# Patient Record
Sex: MALE | Race: WHITE | NOT HISPANIC OR LATINO | Employment: FULL TIME | URBAN - METROPOLITAN AREA
[De-identification: names, ages, dates, MRNs, and addresses within clinical notes are randomized per-mention and may not be internally consistent; named-entity substitution may affect disease eponyms.]

---

## 2017-05-08 ENCOUNTER — LAB (OUTPATIENT)
Dept: SPORTS MEDICINE | Facility: CLINIC | Age: 52
End: 2017-05-08

## 2017-05-08 DIAGNOSIS — Z00.00 PREVENTATIVE HEALTH CARE: Primary | ICD-10-CM

## 2017-05-08 DIAGNOSIS — Z00.00 PREVENTATIVE HEALTH CARE: ICD-10-CM

## 2017-05-09 LAB
ALBUMIN SERPL-MCNC: 4.3 G/DL (ref 3.5–5.2)
ALBUMIN/GLOB SERPL: 1.4 G/DL
ALP SERPL-CCNC: 53 U/L (ref 39–117)
ALT SERPL-CCNC: 28 U/L (ref 1–41)
APPEARANCE UR: CLEAR
AST SERPL-CCNC: 21 U/L (ref 1–40)
BACTERIA #/AREA URNS HPF: NORMAL /HPF
BASOPHILS # BLD AUTO: 0.01 10*3/MM3 (ref 0–0.2)
BASOPHILS NFR BLD AUTO: 0.2 % (ref 0–1.5)
BILIRUB SERPL-MCNC: 1.2 MG/DL (ref 0.1–1.2)
BILIRUB UR QL STRIP: NEGATIVE
BUN SERPL-MCNC: 10 MG/DL (ref 6–20)
BUN/CREAT SERPL: 9.5 (ref 7–25)
CALCIUM SERPL-MCNC: 9.6 MG/DL (ref 8.6–10.5)
CHLORIDE SERPL-SCNC: 101 MMOL/L (ref 98–107)
CHOLEST SERPL-MCNC: 216 MG/DL (ref 0–200)
CHOLEST/HDLC SERPL: 3.22 {RATIO}
CO2 SERPL-SCNC: 28.8 MMOL/L (ref 22–29)
COLOR UR: YELLOW
CREAT SERPL-MCNC: 1.05 MG/DL (ref 0.76–1.27)
EOSINOPHIL # BLD AUTO: 0.29 10*3/MM3 (ref 0–0.7)
EOSINOPHIL NFR BLD AUTO: 5.2 % (ref 0.3–6.2)
EPI CELLS #/AREA URNS HPF: NORMAL /HPF
ERYTHROCYTE [DISTWIDTH] IN BLOOD BY AUTOMATED COUNT: 13.6 % (ref 11.5–14.5)
GLOBULIN SER CALC-MCNC: 3 GM/DL
GLUCOSE SERPL-MCNC: 90 MG/DL (ref 65–99)
GLUCOSE UR QL: NEGATIVE
HCT VFR BLD AUTO: 51 % (ref 40.4–52.2)
HCV AB S/CO SERPL IA: 0.1 S/CO RATIO (ref 0–0.9)
HDLC SERPL-MCNC: 67 MG/DL (ref 40–60)
HGB BLD-MCNC: 17 G/DL (ref 13.7–17.6)
HGB UR QL STRIP: NEGATIVE
IMM GRANULOCYTES # BLD: 0 10*3/MM3 (ref 0–0.03)
IMM GRANULOCYTES NFR BLD: 0 % (ref 0–0.5)
KETONES UR QL STRIP: NEGATIVE
LDLC SERPL CALC-MCNC: 130 MG/DL (ref 0–100)
LEUKOCYTE ESTERASE UR QL STRIP: NEGATIVE
LYMPHOCYTES # BLD AUTO: 1.88 10*3/MM3 (ref 0.9–4.8)
LYMPHOCYTES NFR BLD AUTO: 33.4 % (ref 19.6–45.3)
MCH RBC QN AUTO: 30.2 PG (ref 27–32.7)
MCHC RBC AUTO-ENTMCNC: 33.3 G/DL (ref 32.6–36.4)
MCV RBC AUTO: 90.6 FL (ref 79.8–96.2)
MICRO URNS: NORMAL
MICRO URNS: NORMAL
MONOCYTES # BLD AUTO: 0.62 10*3/MM3 (ref 0.2–1.2)
MONOCYTES NFR BLD AUTO: 11 % (ref 5–12)
MUCOUS THREADS URNS QL MICRO: PRESENT /HPF
NEUTROPHILS # BLD AUTO: 2.83 10*3/MM3 (ref 1.9–8.1)
NEUTROPHILS NFR BLD AUTO: 50.2 % (ref 42.7–76)
NITRITE UR QL STRIP: NEGATIVE
PH UR STRIP: 6.5 [PH] (ref 5–7.5)
PLATELET # BLD AUTO: 213 10*3/MM3 (ref 140–500)
POTASSIUM SERPL-SCNC: 4.3 MMOL/L (ref 3.5–5.2)
PROT SERPL-MCNC: 7.3 G/DL (ref 6–8.5)
PROT UR QL STRIP: NEGATIVE
PSA SERPL-MCNC: 0.71 NG/ML (ref 0–4)
RBC # BLD AUTO: 5.63 10*6/MM3 (ref 4.6–6)
RBC #/AREA URNS HPF: NORMAL /HPF
SODIUM SERPL-SCNC: 141 MMOL/L (ref 136–145)
SP GR UR: 1.01 (ref 1–1.03)
T4 FREE SERPL-MCNC: 1.36 NG/DL (ref 0.93–1.7)
TRIGL SERPL-MCNC: 97 MG/DL (ref 0–150)
TSH SERPL DL<=0.005 MIU/L-ACNC: 1.66 MIU/ML (ref 0.27–4.2)
URINALYSIS REFLEX: NORMAL
UROBILINOGEN UR STRIP-MCNC: 0.2 MG/DL (ref 0.2–1)
VLDLC SERPL CALC-MCNC: 19.4 MG/DL (ref 5–40)
WBC # BLD AUTO: 5.63 10*3/MM3 (ref 4.5–10.7)
WBC #/AREA URNS HPF: NORMAL /HPF

## 2017-05-16 ENCOUNTER — OFFICE VISIT (OUTPATIENT)
Dept: SPORTS MEDICINE | Facility: CLINIC | Age: 52
End: 2017-05-16

## 2017-05-16 VITALS
OXYGEN SATURATION: 99 % | HEIGHT: 74 IN | HEART RATE: 46 BPM | SYSTOLIC BLOOD PRESSURE: 108 MMHG | DIASTOLIC BLOOD PRESSURE: 72 MMHG | BODY MASS INDEX: 26.54 KG/M2 | WEIGHT: 206.78 LBS | RESPIRATION RATE: 16 BRPM

## 2017-05-16 DIAGNOSIS — B35.1 ONYCHOMYCOSIS OF TOENAIL: ICD-10-CM

## 2017-05-16 DIAGNOSIS — Z00.00 PREVENTATIVE HEALTH CARE: Primary | ICD-10-CM

## 2017-05-16 PROCEDURE — 99396 PREV VISIT EST AGE 40-64: CPT | Performed by: FAMILY MEDICINE

## 2017-05-16 RX ORDER — TERBINAFINE HYDROCHLORIDE 250 MG/1
250 TABLET ORAL DAILY
Qty: 90 TABLET | Refills: 0 | Status: SHIPPED | OUTPATIENT
Start: 2017-05-16 | End: 2018-09-06

## 2018-08-29 DIAGNOSIS — Z00.00 ANNUAL PHYSICAL EXAM: Primary | ICD-10-CM

## 2018-08-29 DIAGNOSIS — Z13.220 SCREENING CHOLESTEROL LEVEL: ICD-10-CM

## 2018-08-29 DIAGNOSIS — Z12.5 PROSTATE CANCER SCREENING: ICD-10-CM

## 2018-08-29 DIAGNOSIS — R79.89 ABNORMAL THYROID BLOOD TEST: ICD-10-CM

## 2018-09-06 ENCOUNTER — OFFICE VISIT (OUTPATIENT)
Dept: SPORTS MEDICINE | Facility: CLINIC | Age: 53
End: 2018-09-06

## 2018-09-06 VITALS
HEIGHT: 74 IN | HEART RATE: 55 BPM | DIASTOLIC BLOOD PRESSURE: 68 MMHG | WEIGHT: 202 LBS | SYSTOLIC BLOOD PRESSURE: 108 MMHG | TEMPERATURE: 98.8 F | BODY MASS INDEX: 25.93 KG/M2 | OXYGEN SATURATION: 97 %

## 2018-09-06 DIAGNOSIS — Z00.00 PREVENTATIVE HEALTH CARE: Primary | ICD-10-CM

## 2018-09-06 PROCEDURE — 99396 PREV VISIT EST AGE 40-64: CPT | Performed by: FAMILY MEDICINE

## 2018-09-06 RX ORDER — DEXTROMETHORPHAN HYDROBROMIDE AND PROMETHAZINE HYDROCHLORIDE 15; 6.25 MG/5ML; MG/5ML
SYRUP ORAL
COMMUNITY
Start: 2018-07-02 | End: 2018-09-06

## 2018-09-06 RX ORDER — TADALAFIL 5 MG/1
TABLET ORAL
COMMUNITY
End: 2018-09-06

## 2018-09-06 RX ORDER — AZITHROMYCIN 250 MG/1
TABLET, FILM COATED ORAL
COMMUNITY
Start: 2018-07-13 | End: 2018-09-06

## 2018-09-06 RX ORDER — PREDNISONE 10 MG/1
TABLET ORAL
COMMUNITY
Start: 2018-07-02 | End: 2018-09-06

## 2018-09-06 RX ORDER — TERBINAFINE HYDROCHLORIDE 250 MG/1
TABLET ORAL
COMMUNITY
Start: 2017-05-16 | End: 2018-09-06

## 2018-09-06 NOTE — PROGRESS NOTES
"Chuck Fierro is here today for an annual physical exam.     Eating a healthy diet. Exercising routinely, started biking and \" I feel as good as I have in a long time\".      I have reviewed the patient's medical, family, and social history in detail and updated the computerized patient record.    Screening history:  Colonoscopy - 2016    Prostate - 2017  Metabolic - last year    Health Maintenance   Topic Date Due   • ZOSTER VACCINE (1 of 2) 09/23/2015   • ANNUAL PHYSICAL  05/17/2018   • INFLUENZA VACCINE  08/01/2018   • COLONOSCOPY  05/20/2026   • TDAP/TD VACCINES (2 - Td) 02/09/2027   • HEPATITIS C SCREENING  Completed       Review of Systems   Constitutional: Negative for activity change, appetite change, chills, diaphoresis, fatigue, fever and unexpected weight change.   HENT: Negative for congestion, ear pain, postnasal drip, rhinorrhea, sinus pressure, sneezing, sore throat and trouble swallowing.    Eyes: Negative for visual disturbance.   Respiratory: Negative for cough, chest tightness, shortness of breath and wheezing.    Cardiovascular: Negative for chest pain and palpitations.   Gastrointestinal: Negative for abdominal pain, blood in stool, nausea and vomiting.   Endocrine: Negative for cold intolerance, polydipsia, polyphagia and polyuria.   Genitourinary: Negative for dysuria, flank pain, frequency, hematuria and urgency.   Musculoskeletal: Negative for arthralgias, back pain, joint swelling and myalgias.   Skin: Negative for rash.   Allergic/Immunologic: Negative for environmental allergies.   Neurological: Negative for dizziness, syncope, weakness, numbness and headaches.   Hematological: Negative for adenopathy. Does not bruise/bleed easily.   Psychiatric/Behavioral: Negative for agitation, decreased concentration, dysphoric mood, sleep disturbance and suicidal ideas. The patient is not nervous/anxious.        /68 (BP Location: Right arm, Patient Position: Sitting, Cuff Size: Adult)   Pulse " "55   Temp 98.8 °F (37.1 °C) (Oral)   Ht 188 cm (74.02\")   Wt 91.6 kg (202 lb)   SpO2 97%   BMI 25.92 kg/m²      Physical Exam    Vital signs reviewed.  General appearance: No acute distress  Eyes: conjunctiva clear without erythema; pupils equally round and reactive  ENT: external ears and nose normal; hearing normal, oropharynx clear  Neck: supple; no thyromegaly  CV: normal rate and rhythm; no peripheral edema  Respiratory: normal respiratory effort; lungs clear to auscultation bilaterally  MSK: normal gait and station; no focal joint deformity or swelling  Skin: no rash or wounds; normal turgor  Neuro: cranial nerves 2-12 grossly intact; normal sensation to light touch  Psych: mood and affect normal; recent and remote memory intact    Orders Only on 08/29/2018   Component Date Value Ref Range Status   • WBC 08/30/2018 7.79  4.50 - 10.70 10*3/mm3 Final   • RBC 08/30/2018 5.41  4.60 - 6.00 10*6/mm3 Final   • Hemoglobin 08/30/2018 16.0  13.7 - 17.6 g/dL Final   • Hematocrit 08/30/2018 50.5  40.4 - 52.2 % Final   • MCV 08/30/2018 93.3  79.8 - 96.2 fL Final   • MCH 08/30/2018 29.6  27.0 - 32.7 pg Final   • MCHC 08/30/2018 31.7* 32.6 - 36.4 g/dL Final   • RDW 08/30/2018 13.5  11.5 - 14.5 % Final   • Platelets 08/30/2018 229  140 - 500 10*3/mm3 Final   • Neutrophil Rel % 08/30/2018 63.7  42.7 - 76.0 % Final   • Lymphocyte Rel % 08/30/2018 24.6  19.6 - 45.3 % Final   • Monocyte Rel % 08/30/2018 9.5  5.0 - 12.0 % Final   • Eosinophil Rel % 08/30/2018 1.9  0.3 - 6.2 % Final   • Basophil Rel % 08/30/2018 0.3  0.0 - 1.5 % Final   • Neutrophils Absolute 08/30/2018 4.96  1.90 - 8.10 10*3/mm3 Final   • Lymphocytes Absolute 08/30/2018 1.92  0.90 - 4.80 10*3/mm3 Final   • Monocytes Absolute 08/30/2018 0.74  0.20 - 1.20 10*3/mm3 Final   • Eosinophils Absolute 08/30/2018 0.15  0.00 - 0.70 10*3/mm3 Final   • Basophils Absolute 08/30/2018 0.02  0.00 - 0.20 10*3/mm3 Final   • Immature Granulocyte Rel % 08/30/2018 0.0  0.0 - 0.5 % " Final   • Immature Grans Absolute 08/30/2018 0.00  0.00 - 0.03 10*3/mm3 Final   • Glucose 08/30/2018 92  65 - 99 mg/dL Final   • BUN 08/30/2018 13  6 - 20 mg/dL Final   • Creatinine 08/30/2018 1.12  0.76 - 1.27 mg/dL Final   • eGFR Non  Am 08/30/2018 69  >60 mL/min/1.73 Final   • eGFR African Am 08/30/2018 83  >60 mL/min/1.73 Final   • BUN/Creatinine Ratio 08/30/2018 11.6  7.0 - 25.0 Final   • Sodium 08/30/2018 141  136 - 145 mmol/L Final   • Potassium 08/30/2018 4.2  3.5 - 5.2 mmol/L Final   • Chloride 08/30/2018 101  98 - 107 mmol/L Final   • Total CO2 08/30/2018 27.8  22.0 - 29.0 mmol/L Final   • Calcium 08/30/2018 9.5  8.6 - 10.5 mg/dL Final   • Total Protein 08/30/2018 6.9  6.0 - 8.5 g/dL Final   • Albumin 08/30/2018 4.70  3.50 - 5.20 g/dL Final   • Globulin 08/30/2018 2.2  gm/dL Final   • A/G Ratio 08/30/2018 2.1  g/dL Final   • Total Bilirubin 08/30/2018 1.1  0.1 - 1.2 mg/dL Final   • Alkaline Phosphatase 08/30/2018 51  39 - 117 U/L Final   • AST (SGOT) 08/30/2018 23  1 - 40 U/L Final   • ALT (SGPT) 08/30/2018 30  1 - 41 U/L Final   • Total Cholesterol 08/30/2018 211* 0 - 200 mg/dL Final   • Triglycerides 08/30/2018 92  0 - 150 mg/dL Final   • HDL Cholesterol 08/30/2018 65* 40 - 60 mg/dL Final   • VLDL Cholesterol 08/30/2018 18.4  5 - 40 mg/dL Final   • LDL Cholesterol  08/30/2018 128* 0 - 100 mg/dL Final   • Chol/HDL Ratio 08/30/2018 3.25   Final   • TSH 08/30/2018 2.360  0.270 - 4.200 mIU/mL Final   • Free T4 08/30/2018 1.44  0.93 - 1.70 ng/dL Final   • Specific Gravity, UA 08/30/2018 1.017  1.005 - 1.030 Final   • pH, UA 08/30/2018 6.0  5.0 - 7.5 Final   • Color, UA 08/30/2018 Yellow  Yellow Final   • Appearance, UA 08/30/2018 Clear  Clear Final   • Leukocytes, UA 08/30/2018 Negative  Negative Final   • Protein 08/30/2018 Negative  Negative/Trace Final   • Glucose, UA 08/30/2018 Negative  Negative Final   • Ketones 08/30/2018 Negative  Negative Final   • Blood, UA 08/30/2018 Negative  Negative Final    • Bilirubin, UA 08/30/2018 Negative  Negative Final   • Urobilinogen, UA 08/30/2018 0.2  0.2 - 1.0 mg/dL Final   • Nitrite, UA 08/30/2018 Negative  Negative Final   • Microscopic Examination 08/30/2018 Comment   Final    Microscopic follows if indicated.   • MICROSCOPIC EXAMINATION 08/30/2018 See below:   Final    Microscopic was indicated and was performed.   • Urinalysis Reflex 08/30/2018 Comment   Final    This specimen will not reflex to a Urine Culture.   • PSA 08/30/2018 0.774  0.000 - 4.000 ng/mL Final   • WBC, UA 08/30/2018 0-5  0 - 5 /hpf Final   • RBC, UA 08/30/2018 None seen  0 - 2 /hpf Final   • Epithelial Cells (non renal) 08/30/2018 None seen  0 - 10 /hpf Final   • Casts 08/30/2018 Present* None seen /lpf Final   • Cast Type 08/30/2018 Hyaline casts  N/A Final   • Mucus, UA 08/30/2018 Present  Not Estab. /hpf Final   • Bacteria, UA 08/30/2018 None seen  None seen/Few /hpf Final        Chuck was seen today for annual exam.    Diagnoses and all orders for this visit:    Preventative health care        Encourage healthy diet and exercise.  Encourage patient to stay up to date on screening examinations as indicated based on age and risk factors.    EMR Dragon/Transcription disclaimer:    Much of this encounter note is an electronic transcription/translation of spoken language to printed text.  The electronic translation of spoken language may permit erroneous, or at times, nonsensical words or phrases to be inadvertently transcribed.  Although I have reviewed the note for such errors some may still exist.

## 2019-09-27 DIAGNOSIS — R79.89 ABNORMAL THYROID BLOOD TEST: ICD-10-CM

## 2019-09-27 DIAGNOSIS — Z12.5 PROSTATE CANCER SCREENING: ICD-10-CM

## 2019-09-27 DIAGNOSIS — Z00.00 ANNUAL PHYSICAL EXAM: Primary | ICD-10-CM

## 2019-09-27 DIAGNOSIS — Z13.220 SCREENING CHOLESTEROL LEVEL: ICD-10-CM

## 2019-09-28 LAB
ALBUMIN SERPL-MCNC: 4.8 G/DL (ref 3.5–5.2)
ALBUMIN/GLOB SERPL: 2 G/DL
ALP SERPL-CCNC: 57 U/L (ref 39–117)
ALT SERPL-CCNC: 47 U/L (ref 1–41)
APPEARANCE UR: CLEAR
AST SERPL-CCNC: 67 U/L (ref 1–40)
BACTERIA #/AREA URNS HPF: ABNORMAL /HPF
BASOPHILS # BLD AUTO: 0.02 10*3/MM3 (ref 0–0.2)
BASOPHILS NFR BLD AUTO: 0.3 % (ref 0–1.5)
BILIRUB SERPL-MCNC: 1.1 MG/DL (ref 0.2–1.2)
BILIRUB UR QL STRIP: NEGATIVE
BUN SERPL-MCNC: 15 MG/DL (ref 6–20)
BUN/CREAT SERPL: 14.3 (ref 7–25)
CALCIUM SERPL-MCNC: 9.6 MG/DL (ref 8.6–10.5)
CASTS URNS MICRO: ABNORMAL
CASTS URNS QL MICRO: PRESENT /LPF
CHLORIDE SERPL-SCNC: 101 MMOL/L (ref 98–107)
CHOLEST SERPL-MCNC: 242 MG/DL (ref 0–200)
CHOLEST/HDLC SERPL: 2.85 {RATIO}
CO2 SERPL-SCNC: 27.6 MMOL/L (ref 22–29)
COLOR UR: YELLOW
CREAT SERPL-MCNC: 1.05 MG/DL (ref 0.76–1.27)
EOSINOPHIL # BLD AUTO: 0.16 10*3/MM3 (ref 0–0.4)
EOSINOPHIL NFR BLD AUTO: 2 % (ref 0.3–6.2)
EPI CELLS #/AREA URNS HPF: ABNORMAL /HPF
ERYTHROCYTE [DISTWIDTH] IN BLOOD BY AUTOMATED COUNT: 12.4 % (ref 12.3–15.4)
GLOBULIN SER CALC-MCNC: 2.4 GM/DL
GLUCOSE SERPL-MCNC: 89 MG/DL (ref 65–99)
GLUCOSE UR QL: NEGATIVE
HCT VFR BLD AUTO: 49.6 % (ref 37.5–51)
HDLC SERPL-MCNC: 85 MG/DL (ref 40–60)
HGB BLD-MCNC: 16.6 G/DL (ref 13–17.7)
HGB UR QL STRIP: NEGATIVE
IMM GRANULOCYTES # BLD AUTO: 0.03 10*3/MM3 (ref 0–0.05)
IMM GRANULOCYTES NFR BLD AUTO: 0.4 % (ref 0–0.5)
KETONES UR QL STRIP: NEGATIVE
LDLC SERPL CALC-MCNC: 140 MG/DL (ref 0–100)
LEUKOCYTE ESTERASE UR QL STRIP: NEGATIVE
LYMPHOCYTES # BLD AUTO: 1.81 10*3/MM3 (ref 0.7–3.1)
LYMPHOCYTES NFR BLD AUTO: 22.9 % (ref 19.6–45.3)
MCH RBC QN AUTO: 29.1 PG (ref 26.6–33)
MCHC RBC AUTO-ENTMCNC: 33.5 G/DL (ref 31.5–35.7)
MCV RBC AUTO: 86.9 FL (ref 79–97)
MICRO URNS: NORMAL
MICRO URNS: NORMAL
MONOCYTES # BLD AUTO: 0.62 10*3/MM3 (ref 0.1–0.9)
MONOCYTES NFR BLD AUTO: 7.8 % (ref 5–12)
MUCOUS THREADS URNS QL MICRO: PRESENT /HPF
NEUTROPHILS # BLD AUTO: 5.26 10*3/MM3 (ref 1.7–7)
NEUTROPHILS NFR BLD AUTO: 66.6 % (ref 42.7–76)
NITRITE UR QL STRIP: NEGATIVE
NRBC BLD AUTO-RTO: 0 /100 WBC (ref 0–0.2)
PH UR STRIP: 6 [PH] (ref 5–7.5)
PLATELET # BLD AUTO: 251 10*3/MM3 (ref 140–450)
POTASSIUM SERPL-SCNC: 4.7 MMOL/L (ref 3.5–5.2)
PROT SERPL-MCNC: 7.2 G/DL (ref 6–8.5)
PROT UR QL STRIP: NORMAL
PSA SERPL-MCNC: 0.7 NG/ML (ref 0–4)
RBC # BLD AUTO: 5.71 10*6/MM3 (ref 4.14–5.8)
RBC #/AREA URNS HPF: ABNORMAL /HPF
SODIUM SERPL-SCNC: 142 MMOL/L (ref 136–145)
SP GR UR: 1.02 (ref 1–1.03)
T4 FREE SERPL-MCNC: 1.34 NG/DL (ref 0.93–1.7)
TRIGL SERPL-MCNC: 87 MG/DL (ref 0–150)
TSH SERPL DL<=0.005 MIU/L-ACNC: 1.77 UIU/ML (ref 0.27–4.2)
URINALYSIS REFLEX: NORMAL
UROBILINOGEN UR STRIP-MCNC: 0.2 MG/DL (ref 0.2–1)
VLDLC SERPL CALC-MCNC: 17.4 MG/DL
WBC # BLD AUTO: 7.9 10*3/MM3 (ref 3.4–10.8)
WBC #/AREA URNS HPF: ABNORMAL /HPF

## 2019-10-01 ENCOUNTER — OFFICE VISIT (OUTPATIENT)
Dept: SPORTS MEDICINE | Facility: CLINIC | Age: 54
End: 2019-10-01

## 2019-10-01 VITALS
DIASTOLIC BLOOD PRESSURE: 60 MMHG | BODY MASS INDEX: 25.67 KG/M2 | SYSTOLIC BLOOD PRESSURE: 102 MMHG | HEART RATE: 72 BPM | WEIGHT: 200 LBS | TEMPERATURE: 98.1 F | OXYGEN SATURATION: 96 % | HEIGHT: 74 IN

## 2019-10-01 DIAGNOSIS — L98.9 SKIN LESION: ICD-10-CM

## 2019-10-01 DIAGNOSIS — R74.8 ELEVATED LIVER ENZYMES: ICD-10-CM

## 2019-10-01 DIAGNOSIS — S89.92XA INJURY OF LEFT KNEE, INITIAL ENCOUNTER: ICD-10-CM

## 2019-10-01 DIAGNOSIS — Z00.00 PREVENTATIVE HEALTH CARE: Primary | ICD-10-CM

## 2019-10-01 PROCEDURE — 99396 PREV VISIT EST AGE 40-64: CPT | Performed by: FAMILY MEDICINE

## 2019-10-01 NOTE — PROGRESS NOTES
"Chuck Fierro is here today for an annual physical exam.     Eating a healthy diet. Exercising routinely.     PHQ-2 Depression Screening  Little interest or pleasure in doing things?  0   Feeling down, depressed, or hopeless?  0   PHQ-2 Total Score  0        August 24, 2019 tripped over wife's luggage in an airport which caused him to fall directly onto the L lateral knee at the fibular head.  Had x-rays done in [place] that were normal.  It has gotten quite a bit better but still occasionally has some discomfort over the lateral knee and slightly posterior lateral.  He has not had any swelling, locking, or giving way.  Rides a bike without any difficulty.    Has noted a darkening lesion on his back that is \"scaly\".    I have reviewed the patient's medical, family, and social history in detail and updated the computerized patient record.    Screening history:  Colonoscopy -due 2026  Prostate -2018  Metabolic - last year    Health Maintenance   Topic Date Due   • ZOSTER VACCINE (1 of 2) 09/23/2015   • INFLUENZA VACCINE  08/01/2019   • ANNUAL PHYSICAL  09/07/2019   • COLONOSCOPY  05/20/2026   • TDAP/TD VACCINES (2 - Td) 02/09/2027   • HEPATITIS C SCREENING  Completed       Review of Systems   Constitutional: Negative for fever.   Musculoskeletal:        Per HPI   Skin: Negative for wound.        Per HPI   Neurological: Negative for numbness.   All other systems reviewed and are negative.      /60 (BP Location: Left arm, Patient Position: Sitting, Cuff Size: Adult)   Pulse 72   Temp 98.1 °F (36.7 °C) (Oral)   Ht 188 cm (74.02\")   Wt 90.7 kg (200 lb)   SpO2 96%   BMI 25.67 kg/m²      Physical Exam    Vital signs reviewed.  General appearance: No acute distress  Eyes: conjunctiva clear without erythema; pupils equally round and reactive  ENT: external ears and nose normal; hearing normal, oropharynx clear  Neck: supple; no thyromegaly  CV: normal rate and rhythm; no peripheral edema  Respiratory: normal " respiratory effort; lungs clear to auscultation bilaterally  MSK: normal gait and station; no focal joint deformity or swelling.  Left knee normal in general appearance, patient has full painless range of motion, negative Sean.  Negative Thessaly.  Negative Lockman.  No pain with varus valgus stress.  No pain with resisted external or internal rotation of the knee.  Skin: no rash or wounds; normal turgor, there is a melanotic seborrheic keratosis on the back  Neuro: cranial nerves 2-12 grossly intact; normal sensation to light touch  Psych: mood and affect normal; recent and remote memory intact    Orders Only on 09/27/2019   Component Date Value Ref Range Status   • WBC 09/27/2019 7.90  3.40 - 10.80 10*3/mm3 Final   • RBC 09/27/2019 5.71  4.14 - 5.80 10*6/mm3 Final   • Hemoglobin 09/27/2019 16.6  13.0 - 17.7 g/dL Final   • Hematocrit 09/27/2019 49.6  37.5 - 51.0 % Final   • MCV 09/27/2019 86.9  79.0 - 97.0 fL Final   • MCH 09/27/2019 29.1  26.6 - 33.0 pg Final   • MCHC 09/27/2019 33.5  31.5 - 35.7 g/dL Final   • RDW 09/27/2019 12.4  12.3 - 15.4 % Final   • Platelets 09/27/2019 251  140 - 450 10*3/mm3 Final   • Neutrophil Rel % 09/27/2019 66.6  42.7 - 76.0 % Final   • Lymphocyte Rel % 09/27/2019 22.9  19.6 - 45.3 % Final   • Monocyte Rel % 09/27/2019 7.8  5.0 - 12.0 % Final   • Eosinophil Rel % 09/27/2019 2.0  0.3 - 6.2 % Final   • Basophil Rel % 09/27/2019 0.3  0.0 - 1.5 % Final   • Neutrophils Absolute 09/27/2019 5.26  1.70 - 7.00 10*3/mm3 Final   • Lymphocytes Absolute 09/27/2019 1.81  0.70 - 3.10 10*3/mm3 Final   • Monocytes Absolute 09/27/2019 0.62  0.10 - 0.90 10*3/mm3 Final   • Eosinophils Absolute 09/27/2019 0.16  0.00 - 0.40 10*3/mm3 Final   • Basophils Absolute 09/27/2019 0.02  0.00 - 0.20 10*3/mm3 Final   • Immature Granulocyte Rel % 09/27/2019 0.4  0.0 - 0.5 % Final   • Immature Grans Absolute 09/27/2019 0.03  0.00 - 0.05 10*3/mm3 Final   • nRBC 09/27/2019 0.0  0.0 - 0.2 /100 WBC Final   • Glucose  09/27/2019 89  65 - 99 mg/dL Final   • BUN 09/27/2019 15  6 - 20 mg/dL Final   • Creatinine 09/27/2019 1.05  0.76 - 1.27 mg/dL Final   • eGFR Non  Am 09/27/2019 74  >60 mL/min/1.73 Final   • eGFR African Am 09/27/2019 89  >60 mL/min/1.73 Final   • BUN/Creatinine Ratio 09/27/2019 14.3  7.0 - 25.0 Final   • Sodium 09/27/2019 142  136 - 145 mmol/L Final   • Potassium 09/27/2019 4.7  3.5 - 5.2 mmol/L Final   • Chloride 09/27/2019 101  98 - 107 mmol/L Final   • Total CO2 09/27/2019 27.6  22.0 - 29.0 mmol/L Final   • Calcium 09/27/2019 9.6  8.6 - 10.5 mg/dL Final   • Total Protein 09/27/2019 7.2  6.0 - 8.5 g/dL Final   • Albumin 09/27/2019 4.80  3.50 - 5.20 g/dL Final   • Globulin 09/27/2019 2.4  gm/dL Final   • A/G Ratio 09/27/2019 2.0  g/dL Final   • Total Bilirubin 09/27/2019 1.1  0.2 - 1.2 mg/dL Final   • Alkaline Phosphatase 09/27/2019 57  39 - 117 U/L Final   • AST (SGOT) 09/27/2019 67* 1 - 40 U/L Final   • ALT (SGPT) 09/27/2019 47* 1 - 41 U/L Final   • Total Cholesterol 09/27/2019 242* 0 - 200 mg/dL Final   • Triglycerides 09/27/2019 87  0 - 150 mg/dL Final   • HDL Cholesterol 09/27/2019 85* 40 - 60 mg/dL Final   • VLDL Cholesterol 09/27/2019 17.4  mg/dL Final   • LDL Cholesterol  09/27/2019 140* 0 - 100 mg/dL Final   • Chol/HDL Ratio 09/27/2019 2.85   Final   • PSA 09/27/2019 0.703  0.000 - 4.000 ng/mL Final   • Free T4 09/27/2019 1.34  0.93 - 1.70 ng/dL Final   • TSH 09/27/2019 1.770  0.270 - 4.200 uIU/mL Final   • Specific Gravity, UA 09/27/2019 1.024  1.005 - 1.030 Final   • pH, UA 09/27/2019 6.0  5.0 - 7.5 Final   • Color, UA 09/27/2019 Yellow  Yellow Final   • Appearance, UA 09/27/2019 Clear  Clear Final   • Leukocytes, UA 09/27/2019 Negative  Negative Final   • Protein 09/27/2019 Trace  Negative/Trace Final   • Glucose, UA 09/27/2019 Negative  Negative Final   • Ketones 09/27/2019 Negative  Negative Final   • Blood, UA 09/27/2019 Negative  Negative Final   • Bilirubin, UA 09/27/2019 Negative  Negative  Final   • Urobilinogen, UA 09/27/2019 0.2  0.2 - 1.0 mg/dL Final   • Nitrite, UA 09/27/2019 Negative  Negative Final   • Microscopic Examination 09/27/2019 Comment   Final    Microscopic follows if indicated.   • Microscopic Examination 09/27/2019 See below:   Final    Microscopic was indicated and was performed.   • Urinalysis Reflex 09/27/2019 Comment   Final    This specimen will not reflex to a Urine Culture.   • WBC, UA 09/27/2019 0-5  0 - 5 /hpf Final   • RBC, UA 09/27/2019 0-2  0 - 2 /hpf Final   • Epithelial Cells (non renal) 09/27/2019 None seen  0 - 10 /hpf Final   • Casts 09/27/2019 Present* None seen /lpf Final   • Cast Type 09/27/2019 Hyaline casts  N/A Final   • Mucus, UA 09/27/2019 Present  Not Estab. /hpf Final   • Bacteria, UA 09/27/2019 None seen  None seen/Few /hpf Final        No current outpatient medications on file.    Chuck was seen today for annual exam.    Diagnoses and all orders for this visit:    Preventative health care    Injury of left knee, initial encounter    Skin lesion  -     Ambulatory Referral to Dermatology    Elevated liver enzymes  -     Hepatitis Panel, Acute; Future  -     Hepatic Function Panel; Future      1.  For his knee pain, it has gotten better status post 5 weeks since injury, most likely bone bruise, follow-up 3 weeks if not back to normal.  2.  Refer to dermatology.    3.  Elevated liver enzymes-Will plan to repeat liver enzymes with him abstaining from cycling for a few days.         4.  Shingrix d/w patient    Encourage healthy diet and exercise.  Encourage patient to stay up to date on screening examinations as indicated based on age and risk factors.    EMR Dragon/Transcription disclaimer:    Much of this encounter note is an electronic transcription/translation of spoken language to printed text.  The electronic translation of spoken language may permit erroneous, or at times, nonsensical words or phrases to be inadvertently transcribed.  Although I have  reviewed the note for such errors some may still exist.

## 2019-10-02 ENCOUNTER — RESULTS ENCOUNTER (OUTPATIENT)
Dept: SPORTS MEDICINE | Facility: CLINIC | Age: 54
End: 2019-10-02

## 2019-10-02 DIAGNOSIS — R74.8 ELEVATED LIVER ENZYMES: ICD-10-CM

## 2019-11-19 LAB
ALBUMIN SERPL-MCNC: 4.3 G/DL (ref 3.5–5.2)
ALP SERPL-CCNC: 56 U/L (ref 39–117)
ALT SERPL-CCNC: 25 U/L (ref 1–41)
AST SERPL-CCNC: 22 U/L (ref 1–40)
BILIRUB DIRECT SERPL-MCNC: 0.2 MG/DL (ref 0.2–0.3)
BILIRUB SERPL-MCNC: 0.9 MG/DL (ref 0.2–1.2)
HAV IGM SERPL QL IA: NEGATIVE
HBV CORE IGM SERPL QL IA: NEGATIVE
HBV SURFACE AG SERPL QL IA: NEGATIVE
HCV AB S/CO SERPL IA: <0.1 S/CO RATIO (ref 0–0.9)
PROT SERPL-MCNC: 7 G/DL (ref 6–8.5)

## 2019-11-21 ENCOUNTER — TELEPHONE (OUTPATIENT)
Dept: SPORTS MEDICINE | Facility: CLINIC | Age: 54
End: 2019-11-21

## 2019-11-21 NOTE — TELEPHONE ENCOUNTER
Patient would like to get xray disc back states that he needs it for another appt and he would be by tomorrow to pick these up. Please let me know if you have them so I can give them to the  staff. Thank you.

## 2019-12-03 ENCOUNTER — OFFICE VISIT (OUTPATIENT)
Dept: ORTHOPEDIC SURGERY | Facility: CLINIC | Age: 54
End: 2019-12-03

## 2019-12-03 VITALS — BODY MASS INDEX: 26.39 KG/M2 | HEIGHT: 74 IN | WEIGHT: 205.6 LBS | TEMPERATURE: 98.3 F

## 2019-12-03 DIAGNOSIS — M25.562 ACUTE PAIN OF LEFT KNEE: Primary | ICD-10-CM

## 2019-12-03 PROCEDURE — 99204 OFFICE O/P NEW MOD 45 MIN: CPT | Performed by: NURSE PRACTITIONER

## 2019-12-03 NOTE — PROGRESS NOTES
"Patient: Chuck Fierro  YOB: 1965 54 y.o. male  Medical Record Number: [id number]    Chief Complaints:   Chief Complaint   Patient presents with   • Left Knee - Pain   • Establish Care       History of Present Illness:Chuck Fierro is a 54 y.o. male who presents as a new patient both myself as well as to the practice with complaints of left knee pain.  Apparently the patient was in an airport [place] fell over his wife's luggage twisted his left knee had acute onset of pain which has persisted since.  He describes the knee pain as a moderate constant ache with clicking locking catching worse with sitting better with ice.  He has tried physical therapy with no improvement.    Allergies: No Known Allergies    Medications:   No current outpatient medications on file.     No current facility-administered medications for this visit.          The following portions of the patient's history were reviewed and updated as appropriate: allergies, current medications, past family history, past medical history, past social history, past surgical history and problem list.    Review of Systems:   A 14 point review of systems was performed. All systems negative except pertinent positives/negative listed in HPI above    Physical Exam:   Vitals:    12/03/19 1340   Temp: 98.3 °F (36.8 °C)   Weight: 93.3 kg (205 lb 9.6 oz)   Height: 188 cm (74\")       General: A and O x 3, ASA, NAD    SCLERA:    Normal    DENTITION:   Normal  Skin clear no unusual lesions noted  Left knee patient has trace amount of effusion noted with 110 degrees flexion neutral and extension with a positive Sean negative Lockman calf soft nontender    Radiology:  Xrays reviewed today secondary to pain minimal arthritic changes noted but he does have a visible loose body.  No compared to views available    Assessment/Plan:  Left knee pain with mechanical symptoms following injury    We will proceed with an MRI of the left knee to further " evaluate and patient will call couple days after regarding results and treatment options

## 2020-01-07 ENCOUNTER — TELEPHONE (OUTPATIENT)
Dept: ORTHOPEDIC SURGERY | Facility: CLINIC | Age: 55
End: 2020-01-07

## 2021-01-22 DIAGNOSIS — Z00.00 ANNUAL PHYSICAL EXAM: Primary | ICD-10-CM

## 2021-01-22 DIAGNOSIS — Z12.5 SCREENING PSA (PROSTATE SPECIFIC ANTIGEN): ICD-10-CM

## 2021-01-28 LAB
ALBUMIN SERPL-MCNC: 4.5 G/DL (ref 3.8–4.9)
ALBUMIN/GLOB SERPL: 1.6 {RATIO} (ref 1.2–2.2)
ALP SERPL-CCNC: 56 IU/L (ref 39–117)
ALT SERPL-CCNC: 30 IU/L (ref 0–44)
APPEARANCE UR: CLEAR
AST SERPL-CCNC: 24 IU/L (ref 0–40)
BACTERIA #/AREA URNS HPF: NORMAL /[HPF]
BASOPHILS # BLD AUTO: 0 X10E3/UL (ref 0–0.2)
BASOPHILS NFR BLD AUTO: 0 %
BILIRUB SERPL-MCNC: 1.1 MG/DL (ref 0–1.2)
BILIRUB UR QL STRIP: NEGATIVE
BUN SERPL-MCNC: 18 MG/DL (ref 6–24)
BUN/CREAT SERPL: 17 (ref 9–20)
CALCIUM SERPL-MCNC: 9.6 MG/DL (ref 8.7–10.2)
CHLORIDE SERPL-SCNC: 101 MMOL/L (ref 96–106)
CHOLEST SERPL-MCNC: 231 MG/DL (ref 100–199)
CHOLEST/HDLC SERPL: 2.9 RATIO (ref 0–5)
CO2 SERPL-SCNC: 21 MMOL/L (ref 20–29)
COLOR UR: YELLOW
CREAT SERPL-MCNC: 1.06 MG/DL (ref 0.76–1.27)
EOSINOPHIL # BLD AUTO: 0.1 X10E3/UL (ref 0–0.4)
EOSINOPHIL NFR BLD AUTO: 1 %
EPI CELLS #/AREA URNS HPF: NORMAL /HPF (ref 0–10)
ERYTHROCYTE [DISTWIDTH] IN BLOOD BY AUTOMATED COUNT: 12.7 % (ref 11.6–15.4)
GLOBULIN SER CALC-MCNC: 2.9 G/DL (ref 1.5–4.5)
GLUCOSE SERPL-MCNC: 86 MG/DL (ref 65–99)
GLUCOSE UR QL: NEGATIVE
HCT VFR BLD AUTO: 49.9 % (ref 37.5–51)
HDLC SERPL-MCNC: 80 MG/DL
HGB BLD-MCNC: 17.1 G/DL (ref 13–17.7)
HGB UR QL STRIP: NEGATIVE
IMM GRANULOCYTES # BLD AUTO: 0 X10E3/UL (ref 0–0.1)
IMM GRANULOCYTES NFR BLD AUTO: 0 %
KETONES UR QL STRIP: NEGATIVE
LDLC SERPL CALC-MCNC: 138 MG/DL (ref 0–99)
LEUKOCYTE ESTERASE UR QL STRIP: NEGATIVE
LYMPHOCYTES # BLD AUTO: 2.1 X10E3/UL (ref 0.7–3.1)
LYMPHOCYTES NFR BLD AUTO: 25 %
MCH RBC QN AUTO: 29.9 PG (ref 26.6–33)
MCHC RBC AUTO-ENTMCNC: 34.3 G/DL (ref 31.5–35.7)
MCV RBC AUTO: 87 FL (ref 79–97)
MICRO URNS: NORMAL
MICRO URNS: NORMAL
MONOCYTES # BLD AUTO: 0.6 X10E3/UL (ref 0.1–0.9)
MONOCYTES NFR BLD AUTO: 7 %
MUCOUS THREADS URNS QL MICRO: PRESENT
NEUTROPHILS # BLD AUTO: 5.6 X10E3/UL (ref 1.4–7)
NEUTROPHILS NFR BLD AUTO: 67 %
NITRITE UR QL STRIP: NEGATIVE
PH UR STRIP: 5.5 [PH] (ref 5–7.5)
PLATELET # BLD AUTO: 252 X10E3/UL (ref 150–450)
POTASSIUM SERPL-SCNC: 4.5 MMOL/L (ref 3.5–5.2)
PROT SERPL-MCNC: 7.4 G/DL (ref 6–8.5)
PROT UR QL STRIP: NEGATIVE
PSA SERPL-MCNC: 0.7 NG/ML (ref 0–4)
RBC # BLD AUTO: 5.72 X10E6/UL (ref 4.14–5.8)
RBC #/AREA URNS HPF: NORMAL /HPF (ref 0–2)
SODIUM SERPL-SCNC: 139 MMOL/L (ref 134–144)
SP GR UR: 1.02 (ref 1–1.03)
T4 FREE SERPL-MCNC: 1.3 NG/DL (ref 0.82–1.77)
TRIGL SERPL-MCNC: 73 MG/DL (ref 0–149)
TSH SERPL DL<=0.005 MIU/L-ACNC: 1.41 UIU/ML (ref 0.45–4.5)
URINALYSIS REFLEX: NORMAL
UROBILINOGEN UR STRIP-MCNC: 0.2 MG/DL (ref 0.2–1)
VLDLC SERPL CALC-MCNC: 13 MG/DL (ref 5–40)
WBC # BLD AUTO: 8.4 X10E3/UL (ref 3.4–10.8)
WBC #/AREA URNS HPF: NORMAL /HPF (ref 0–5)

## 2021-01-28 NOTE — PROGRESS NOTES
Called patient; left message on voicemail regarding blood work results (labs look good) per Dr. Knight. If he has any questions, he can call back to the clinic.

## 2021-02-01 ENCOUNTER — OFFICE VISIT (OUTPATIENT)
Dept: SPORTS MEDICINE | Facility: CLINIC | Age: 56
End: 2021-02-01

## 2021-02-01 VITALS
DIASTOLIC BLOOD PRESSURE: 74 MMHG | BODY MASS INDEX: 25.28 KG/M2 | RESPIRATION RATE: 16 BRPM | WEIGHT: 197 LBS | TEMPERATURE: 98.7 F | SYSTOLIC BLOOD PRESSURE: 112 MMHG | OXYGEN SATURATION: 99 % | HEART RATE: 71 BPM | HEIGHT: 74 IN

## 2021-02-01 DIAGNOSIS — Z82.49 FAMILY HISTORY OF CORONARY ARTERY DISEASE: ICD-10-CM

## 2021-02-01 DIAGNOSIS — Z00.00 PREVENTATIVE HEALTH CARE: Primary | ICD-10-CM

## 2021-02-01 PROBLEM — S83.207A ACUTE MENISCAL TEAR OF LEFT KNEE: Status: ACTIVE | Noted: 2020-01-28

## 2021-02-01 PROCEDURE — 99396 PREV VISIT EST AGE 40-64: CPT | Performed by: FAMILY MEDICINE

## 2021-02-01 NOTE — PROGRESS NOTES
Chuck Fierro is here today for an annual physical exam.     Eating a healthy diet. Exercising routinely.     PHQ-2 Depression Screening  Little interest or pleasure in doing things? 0   Feeling down, depressed, or hopeless? 0   PHQ-2 Total Score 0        I have reviewed the patient's medical, family, and social history in detail and updated the computerized patient record.    Screening history:  Colonoscopy - utd  Prostate - due  Metabolic - last year    Health Maintenance   Topic Date Due   • ZOSTER VACCINE (1 of 2) 09/23/2015   • ANNUAL PHYSICAL  10/02/2020   • COLONOSCOPY  05/20/2026   • TDAP/TD VACCINES (3 - Td) 02/02/2030   • HEPATITIS C SCREENING  Completed   • INFLUENZA VACCINE  Completed   • Pneumococcal Vaccine 0-64  Aged Out   • MENINGOCOCCAL VACCINE  Aged Out       Review of Systems   Constitutional: Negative for activity change, appetite change, chills, diaphoresis, fatigue, fever and unexpected weight change.   HENT: Negative for congestion, ear pain, postnasal drip, rhinorrhea, sinus pressure, sneezing, sore throat and trouble swallowing.    Eyes: Negative for visual disturbance.   Respiratory: Negative for cough, chest tightness, shortness of breath and wheezing.    Cardiovascular: Negative for chest pain and palpitations.   Gastrointestinal: Negative for abdominal pain, blood in stool, nausea and vomiting.   Endocrine: Negative for cold intolerance, polydipsia, polyphagia and polyuria.   Genitourinary: Negative for dysuria, flank pain, frequency, hematuria and urgency.   Musculoskeletal: Negative for arthralgias, back pain, joint swelling and myalgias.   Skin: Negative for rash.   Allergic/Immunologic: Negative for environmental allergies.   Neurological: Negative for dizziness, syncope, weakness, numbness and headaches.   Hematological: Negative for adenopathy. Does not bruise/bleed easily.   Psychiatric/Behavioral: Negative for agitation, decreased concentration, dysphoric mood, sleep disturbance  "and suicidal ideas. The patient is not nervous/anxious.        /74 (BP Location: Left arm, Patient Position: Sitting, Cuff Size: Adult)   Pulse 71   Temp 98.7 °F (37.1 °C)   Resp 16   Ht 188 cm (74\")   Wt 89.4 kg (197 lb)   SpO2 99%   BMI 25.29 kg/m²      Physical Exam    Vital signs reviewed.  General appearance: No acute distress  Eyes: conjunctiva clear without erythema; pupils equally round and reactive  ENT: external ears normal; hearing normal  Neck: supple; no thyromegaly  CV: normal rate and rhythm; no peripheral edema  Respiratory: normal respiratory effort; lungs clear to auscultation bilaterally  MSK: normal gait and station; no focal joint deformity or swelling  Skin: no rash or wounds; normal turgor  Neuro: cranial nerves 2-12 grossly intact; normal sensation to light touch  Psych: mood and affect normal; recent and remote memory intact    Orders Only on 01/22/2021   Component Date Value Ref Range Status   • WBC 01/27/2021 8.4  3.4 - 10.8 x10E3/uL Final   • RBC 01/27/2021 5.72  4.14 - 5.80 x10E6/uL Final   • Hemoglobin 01/27/2021 17.1  13.0 - 17.7 g/dL Final   • Hematocrit 01/27/2021 49.9  37.5 - 51.0 % Final   • MCV 01/27/2021 87  79 - 97 fL Final   • MCH 01/27/2021 29.9  26.6 - 33.0 pg Final   • MCHC 01/27/2021 34.3  31.5 - 35.7 g/dL Final   • RDW 01/27/2021 12.7  11.6 - 15.4 % Final   • Platelets 01/27/2021 252  150 - 450 x10E3/uL Final   • Neutrophil Rel % 01/27/2021 67  Not Estab. % Final   • Lymphocyte Rel % 01/27/2021 25  Not Estab. % Final   • Monocyte Rel % 01/27/2021 7  Not Estab. % Final   • Eosinophil Rel % 01/27/2021 1  Not Estab. % Final   • Basophil Rel % 01/27/2021 0  Not Estab. % Final   • Neutrophils Absolute 01/27/2021 5.6  1.4 - 7.0 x10E3/uL Final   • Lymphocytes Absolute 01/27/2021 2.1  0.7 - 3.1 x10E3/uL Final   • Monocytes Absolute 01/27/2021 0.6  0.1 - 0.9 x10E3/uL Final   • Eosinophils Absolute 01/27/2021 0.1  0.0 - 0.4 x10E3/uL Final   • Basophils Absolute " 01/27/2021 0.0  0.0 - 0.2 x10E3/uL Final   • Immature Granulocyte Rel % 01/27/2021 0  Not Estab. % Final   • Immature Grans Absolute 01/27/2021 0.0  0.0 - 0.1 x10E3/uL Final   • Glucose 01/27/2021 86  65 - 99 mg/dL Final   • BUN 01/27/2021 18  6 - 24 mg/dL Final   • Creatinine 01/27/2021 1.06  0.76 - 1.27 mg/dL Final   • eGFR Non  Am 01/27/2021 79  >59 mL/min/1.73 Final   • eGFR African Am 01/27/2021 91  >59 mL/min/1.73 Final   • BUN/Creatinine Ratio 01/27/2021 17  9 - 20 Final   • Sodium 01/27/2021 139  134 - 144 mmol/L Final   • Potassium 01/27/2021 4.5  3.5 - 5.2 mmol/L Final   • Chloride 01/27/2021 101  96 - 106 mmol/L Final   • Total CO2 01/27/2021 21  20 - 29 mmol/L Final   • Calcium 01/27/2021 9.6  8.7 - 10.2 mg/dL Final   • Total Protein 01/27/2021 7.4  6.0 - 8.5 g/dL Final   • Albumin 01/27/2021 4.5  3.8 - 4.9 g/dL Final   • Globulin 01/27/2021 2.9  1.5 - 4.5 g/dL Final   • A/G Ratio 01/27/2021 1.6  1.2 - 2.2 Final   • Total Bilirubin 01/27/2021 1.1  0.0 - 1.2 mg/dL Final   • Alkaline Phosphatase 01/27/2021 56  39 - 117 IU/L Final   • AST (SGOT) 01/27/2021 24  0 - 40 IU/L Final   • ALT (SGPT) 01/27/2021 30  0 - 44 IU/L Final   • Total Cholesterol 01/27/2021 231* 100 - 199 mg/dL Final   • Triglycerides 01/27/2021 73  0 - 149 mg/dL Final   • HDL Cholesterol 01/27/2021 80  >39 mg/dL Final   • VLDL Cholesterol Cristi 01/27/2021 13  5 - 40 mg/dL Final   • LDL Chol Calc ([facility]) 01/27/2021 138* 0 - 99 mg/dL Final   • Chol/HDL Ratio 01/27/2021 2.9  0.0 - 5.0 ratio Final    Comment:                                   T. Chol/HDL Ratio                                              Men  Women                                1/2 Avg.Risk  3.4    3.3                                    Avg.Risk  5.0    4.4                                 2X Avg.Risk  9.6    7.1                                 3X Avg.Risk 23.4   11.0     • Specific Gravity, UA 01/27/2021 1.019  1.005 - 1.030 Final   • pH, UA 01/27/2021 5.5  5.0 - 7.5 Final    • Color, UA 01/27/2021 Yellow  Yellow Final   • Appearance, UA 01/27/2021 Clear  Clear Final   • Leukocytes, UA 01/27/2021 Negative  Negative Final   • Protein 01/27/2021 Negative  Negative/Trace Final   • Glucose, UA 01/27/2021 Negative  Negative Final   • Ketones 01/27/2021 Negative  Negative Final   • Blood, UA 01/27/2021 Negative  Negative Final   • Bilirubin, UA 01/27/2021 Negative  Negative Final   • Urobilinogen, UA 01/27/2021 0.2  0.2 - 1.0 mg/dL Final   • Nitrite, UA 01/27/2021 Negative  Negative Final   • Microscopic Examination 01/27/2021 Comment   Final    Microscopic follows if indicated.   • Microscopic Examination 01/27/2021 See below:   Final    Microscopic was indicated and was performed.   • Urinalysis Reflex 01/27/2021 Comment   Final    This specimen will not reflex to a Urine Culture.   • TSH 01/27/2021 1.410  0.450 - 4.500 uIU/mL Final   • Free T4 01/27/2021 1.30  0.82 - 1.77 ng/dL Final   • PSA 01/27/2021 0.7  0.0 - 4.0 ng/mL Final    Comment: Roche ECLIA methodology.  According to the American Urological Association, Serum PSA should  decrease and remain at undetectable levels after radical  prostatectomy. The AUA defines biochemical recurrence as an initial  PSA value 0.2 ng/mL or greater followed by a subsequent confirmatory  PSA value 0.2 ng/mL or greater.  Values obtained with different assay methods or kits cannot be used  interchangeably. Results cannot be interpreted as absolute evidence  of the presence or absence of malignant disease.     • WBC, UA 01/27/2021 0-5  0 - 5 /hpf Final   • RBC, UA 01/27/2021 None seen  0 - 2 /hpf Final   • Epithelial Cells (non renal) 01/27/2021 None seen  0 - 10 /hpf Final   • Mucus, UA 01/27/2021 Present  Not Estab. Final   • Bacteria, UA 01/27/2021 None seen  None seen/Few Final        No current outpatient medications on file.    Diagnoses and all orders for this visit:    1. Preventative health care (Primary)      Shingrix d/w  patient      Encourage healthy diet and exercise.  Encourage patient to stay up to date on screening examinations as indicated based on age and risk factors.    EMR Dragon/Transcription disclaimer:    Much of this encounter note is an electronic transcription/translation of spoken language to printed text.  The electronic translation of spoken language may permit erroneous, or at times, nonsensical words or phrases to be inadvertently transcribed.  Although I have reviewed the note for such errors some may still exist.

## 2021-02-25 ENCOUNTER — HOSPITAL ENCOUNTER (OUTPATIENT)
Dept: CT IMAGING | Facility: HOSPITAL | Age: 56
Discharge: HOME | End: 2021-02-25
Admitting: FAMILY MEDICINE

## 2021-02-25 DIAGNOSIS — Z82.49 FAMILY HISTORY OF CORONARY ARTERY DISEASE: ICD-10-CM

## 2021-02-25 PROCEDURE — 75571 CT HRT W/O DYE W/CA TEST: CPT

## 2021-02-26 NOTE — PROGRESS NOTES
Called patient in regards to CT cardiac calcium score results. Informed him (Coronary calcium score is very low at 2, this means he is at low risk for heart disease.  Would suggest repeat scan in 3 to 4 years.) per Dr. Knight.

## 2022-03-31 ENCOUNTER — OFFICE VISIT (OUTPATIENT)
Dept: SPORTS MEDICINE | Facility: CLINIC | Age: 57
End: 2022-03-31

## 2022-03-31 VITALS
HEIGHT: 74 IN | SYSTOLIC BLOOD PRESSURE: 108 MMHG | DIASTOLIC BLOOD PRESSURE: 68 MMHG | OXYGEN SATURATION: 98 % | BODY MASS INDEX: 25.67 KG/M2 | HEART RATE: 64 BPM | WEIGHT: 200 LBS | TEMPERATURE: 98.6 F

## 2022-03-31 DIAGNOSIS — Z00.00 PREVENTATIVE HEALTH CARE: Primary | ICD-10-CM

## 2022-03-31 PROCEDURE — 99396 PREV VISIT EST AGE 40-64: CPT | Performed by: FAMILY MEDICINE

## 2022-03-31 NOTE — PROGRESS NOTES
"Chuck Fierro is here today for an annual physical exam.     Eating a healthy diet. Exercising routinely.     PHQ-2 Depression Screening  Little interest or pleasure in doing things?  0   Feeling down, depressed, or hopeless?  0   PHQ-2 Total Score  0        2-3 times a year he can get one eye that starts with itching in the corner of the eye (only one eye) he scratches it and then the lids get swollen and itching . Has seen opth and they have no ideas. Suggested to see allergist. Benadryl helps    I have reviewed the patient's medical, family, and social history in detail and updated the computerized patient record.    Screening history:  Colonoscopy - utd  Prostate - due  Metabolic - last year    Health Maintenance   Topic Date Due   • INFLUENZA VACCINE  08/01/2021   • ANNUAL PHYSICAL  02/02/2022   • ZOSTER VACCINE (2 of 2) 04/09/2022   • COLORECTAL CANCER SCREENING  05/20/2026   • TDAP/TD VACCINES (3 - Td or Tdap) 02/02/2030   • HEPATITIS C SCREENING  Completed   • COVID-19 Vaccine  Completed   • Pneumococcal Vaccine 0-64  Aged Out       Review of Systems    /68 (BP Location: Left arm, Patient Position: Sitting, Cuff Size: Adult)   Pulse 64   Temp 98.6 °F (37 °C) (Temporal)   Ht 188 cm (74.02\")   Wt 90.7 kg (200 lb)   SpO2 98%   BMI 25.67 kg/m²      Physical Exam    Vital signs reviewed.  General appearance: No acute distress  Eyes: conjunctiva clear without erythema; pupils equally round and reactive  ENT: external ears normal; hearing normal  Neck: supple; no thyromegaly  CV: normal rate and rhythm; no peripheral edema  Respiratory: normal respiratory effort; lungs clear to auscultation bilaterally  MSK: normal gait and station; no focal joint deformity or swelling  Skin: no rash or wounds; normal turgor  Neuro: cranial nerves 2-12 grossly intact; normal sensation to light touch  Psych: mood and affect normal; recent and remote memory intact    Results Encounter on 03/28/2022   Component Date Value " Ref Range Status   • Glucose 03/28/2022 102 (A) 65 - 99 mg/dL Final   • BUN 03/28/2022 11  6 - 24 mg/dL Final   • Creatinine 03/28/2022 1.05  0.76 - 1.27 mg/dL Final   • EGFR Result 03/28/2022 83  >59 mL/min/1.73 Final   • BUN/Creatinine Ratio 03/28/2022 10  9 - 20 Final   • Sodium 03/28/2022 139  134 - 144 mmol/L Final   • Potassium 03/28/2022 3.9  3.5 - 5.2 mmol/L Final   • Chloride 03/28/2022 101  96 - 106 mmol/L Final   • Total CO2 03/28/2022 26  20 - 29 mmol/L Final   • Calcium 03/28/2022 9.6  8.7 - 10.2 mg/dL Final   • Total Protein 03/28/2022 7.0  6.0 - 8.5 g/dL Final   • Albumin 03/28/2022 4.3  3.8 - 4.9 g/dL Final   • Globulin 03/28/2022 2.7  1.5 - 4.5 g/dL Final   • A/G Ratio 03/28/2022 1.6  1.2 - 2.2 Final   • Total Bilirubin 03/28/2022 0.7  0.0 - 1.2 mg/dL Final   • Alkaline Phosphatase 03/28/2022 66  44 - 121 IU/L Final   • AST (SGOT) 03/28/2022 28  0 - 40 IU/L Final   • ALT (SGPT) 03/28/2022 32  0 - 44 IU/L Final   • Hemoglobin A1C 03/28/2022 5.2  4.8 - 5.6 % Final    Comment:          Prediabetes: 5.7 - 6.4           Diabetes: >6.4           Glycemic control for adults with diabetes: <7.0     • WBC 03/28/2022 6.0  3.4 - 10.8 x10E3/uL Final   • RBC 03/28/2022 5.60  4.14 - 5.80 x10E6/uL Final   • Hemoglobin 03/28/2022 15.8  13.0 - 17.7 g/dL Final   • Hematocrit 03/28/2022 48.2  37.5 - 51.0 % Final   • MCV 03/28/2022 86  79 - 97 fL Final   • MCH 03/28/2022 28.2  26.6 - 33.0 pg Final   • MCHC 03/28/2022 32.8  31.5 - 35.7 g/dL Final   • RDW 03/28/2022 12.6  11.6 - 15.4 % Final   • Platelets 03/28/2022 243  150 - 450 x10E3/uL Final   • Neutrophil Rel % 03/28/2022 54  Not Estab. % Final   • Lymphocyte Rel % 03/28/2022 34  Not Estab. % Final   • Monocyte Rel % 03/28/2022 8  Not Estab. % Final   • Eosinophil Rel % 03/28/2022 4  Not Estab. % Final   • Basophil Rel % 03/28/2022 0  Not Estab. % Final   • Neutrophils Absolute 03/28/2022 3.2  1.4 - 7.0 x10E3/uL Final   • Lymphocytes Absolute 03/28/2022 2.0  0.7 -  3.1 x10E3/uL Final   • Monocytes Absolute 03/28/2022 0.5  0.1 - 0.9 x10E3/uL Final   • Eosinophils Absolute 03/28/2022 0.2  0.0 - 0.4 x10E3/uL Final   • Basophils Absolute 03/28/2022 0.0  0.0 - 0.2 x10E3/uL Final   • Immature Granulocyte Rel % 03/28/2022 0  Not Estab. % Final   • Immature Grans Absolute 03/28/2022 0.0  0.0 - 0.1 x10E3/uL Final   • Total Cholesterol 03/28/2022 190  100 - 199 mg/dL Final   • Triglycerides 03/28/2022 79  0 - 149 mg/dL Final   • HDL Cholesterol 03/28/2022 60  >39 mg/dL Final   • VLDL Cholesterol Cristi 03/28/2022 14  5 - 40 mg/dL Final   • LDL Chol Calc ([facility]) 03/28/2022 116 (A) 0 - 99 mg/dL Final   • Chol/HDL Ratio 03/28/2022 3.2  0.0 - 5.0 ratio Final    Comment:                                   T. Chol/HDL Ratio                                              Men  Women                                1/2 Avg.Risk  3.4    3.3                                    Avg.Risk  5.0    4.4                                 2X Avg.Risk  9.6    7.1                                 3X Avg.Risk 23.4   11.0     • PSA 03/28/2022 0.7  0.0 - 4.0 ng/mL Final    Comment: Roche ECLIA methodology.  According to the American Urological Association, Serum PSA should  decrease and remain at undetectable levels after radical  prostatectomy. The AUA defines biochemical recurrence as an initial  PSA value 0.2 ng/mL or greater followed by a subsequent confirmatory  PSA value 0.2 ng/mL or greater.  Values obtained with different assay methods or kits cannot be used  interchangeably. Results cannot be interpreted as absolute evidence  of the presence or absence of malignant disease.     • Free T4 03/28/2022 1.37  0.82 - 1.77 ng/dL Final   • TSH 03/28/2022 2.730  0.450 - 4.500 uIU/mL Final   • Specific Gravity, UA 03/28/2022 1.007  1.005 - 1.030 Final   • pH, UA 03/28/2022 7.0  5.0 - 7.5 Final   • Color, UA 03/28/2022 Yellow  Yellow Final   • Appearance, UA 03/28/2022 Clear  Clear Final   • Leukocytes, UA 03/28/2022  Negative  Negative Final   • Protein 03/28/2022 Negative  Negative/Trace Final   • Glucose, UA 03/28/2022 Negative  Negative Final   • Ketones 03/28/2022 Negative  Negative Final   • Blood, UA 03/28/2022 Negative  Negative Final   • Bilirubin, UA 03/28/2022 Negative  Negative Final   • Urobilinogen, UA 03/28/2022 0.2  0.2 - 1.0 mg/dL Final   • Nitrite, UA 03/28/2022 Negative  Negative Final   • Microscopic Examination 03/28/2022 Comment   Final    Microscopic follows if indicated.   • Microscopic Examination 03/28/2022 See below:   Final    Microscopic was indicated and was performed.   • Urinalysis Reflex 03/28/2022 Comment   Final    This specimen will not reflex to a Urine Culture.   • WBC, UA 03/28/2022 None seen  0 - 5 /hpf Final   • RBC, UA 03/28/2022 None seen  0 - 2 /hpf Final   • Epithelial Cells (non renal) 03/28/2022 None seen  0 - 10 /hpf Final   • Casts 03/28/2022 None seen  None seen /lpf Final   • Bacteria, UA 03/28/2022 None seen  None seen/Few Final        No current outpatient medications on file.    Diagnoses and all orders for this visit:    1. Preventative health care (Primary)      Gave him the name of Brett Aguayo allergist for he eyelid complaint.     Encourage healthy diet and exercise.  Encourage patient to stay up to date on screening examinations as indicated based on age and risk factors.    EMR Dragon/Transcription disclaimer:    Much of this encounter note is an electronic transcription/translation of spoken language to printed text.  The electronic translation of spoken language may permit erroneous, or at times, nonsensical words or phrases to be inadvertently transcribed.  Although I have reviewed the note for such errors some may still exist.

## 2022-10-07 ENCOUNTER — OFFICE VISIT (OUTPATIENT)
Dept: FAMILY MEDICINE CLINIC | Facility: CLINIC | Age: 57
End: 2022-10-07

## 2022-10-07 VITALS
BODY MASS INDEX: 26.41 KG/M2 | TEMPERATURE: 97.1 F | DIASTOLIC BLOOD PRESSURE: 60 MMHG | OXYGEN SATURATION: 98 % | SYSTOLIC BLOOD PRESSURE: 120 MMHG | RESPIRATION RATE: 12 BRPM | HEART RATE: 46 BPM | HEIGHT: 74 IN | WEIGHT: 205.8 LBS

## 2022-10-07 DIAGNOSIS — E78.5 MILD HYPERLIPIDEMIA: Primary | ICD-10-CM

## 2022-10-07 DIAGNOSIS — Z12.5 PROSTATE CANCER SCREENING: ICD-10-CM

## 2022-10-07 DIAGNOSIS — E55.9 VITAMIN D DEFICIENCY: ICD-10-CM

## 2022-10-07 DIAGNOSIS — Z00.00 HEALTH MAINTENANCE EXAMINATION: ICD-10-CM

## 2022-10-07 PROCEDURE — 99396 PREV VISIT EST AGE 40-64: CPT | Performed by: NURSE PRACTITIONER

## 2022-10-07 NOTE — PROGRESS NOTES
"Chief Complaint  Establish Care    Subjective        Chuck Fierro presents to [facility]     History of Present Illness    The patient is here today to establish care.    He needs a new PCP. He was previously seen Dr. Knight. He has no acute problems presently. He does not need a physical today, and he does not take any medications. He has a history of mild hyperlipidemia with good HDL. He has no hypertension, or diabetes. No other chronic medical conditions.     Generally, he is in good health. He exercises frequently; loves to ride his bike several days a week carefully, which he has been riding since 2016. He had a meniscus tear of his left knee quite a few years ago. He did physical therapy and improved quite a bit, now he's doing great. He takes vitamin D 5000 international units daily and he will decrease this to every other day.     EKG is up to date. At this last year, colonoscopy is up to date as well as labs are up to date. He plans to return to office for his upcoming labs. He has no history of prediabetes or diabetes. He has had a couple of fasting impaired glucose, but I think these are more likely as it was not a true fasting, so he has no glucose abnormalities to my knowledge or any prediabetes. His A1c's were normal.       Social history: , kids, grandkids, minimal alcohol responsibly, no tobacco or drug use.    Past medical history as above.     Family history: No colon or prostate cancer.       Objective   Vital Signs:  /60   Pulse (!) 46   Temp 97.1 °F (36.2 °C) (Infrared)   Resp 12   Ht 188 cm (74.02\")   Wt 93.4 kg (205 lb 12.8 oz)   SpO2 98%   BMI 26.41 kg/m²   Estimated body mass index is 26.41 kg/m² as calculated from the following:    Height as of this encounter: 188 cm (74.02\").    Weight as of this encounter: 93.4 kg (205 lb 12.8 oz).          Physical Exam     Physical exam  Normal, alert and oriented, no acute distress.   Eyes are " clear. PERRLA.  Neck is supple. No cervical adenopathy.   Carotids are clear throughout.   Thyroid is normal. No nodules, no masses.   Chest is clear throughout. Heart regular rate and rhythm without murmur.   Abdomen is soft. No hepatosplenomegaly, masses or hernias.  Lower extremity without edema.   Skin is warm and dry.   Psych: appropriate, pleasant.     Result Review :                    Assessment and Plan   Diagnoses and all orders for this visit:    1. Health maintenance examination (Primary)  -     CBC & Differential; Future  -     Comprehensive Metabolic Panel; Future  -     Lipid Panel With LDL / HDL Ratio; Future  -     TSH Rfx On Abnormal To Free T4; Future  -     PSA Screen; Future  -     Urinalysis With Microscopic If Indicated (No Culture) - Urine, Clean Catch; Future  -     Vitamin D 25 Hydroxy; Future    2. Mild hyperlipidemia  -     CBC & Differential; Future  -     Comprehensive Metabolic Panel; Future  -     Lipid Panel With LDL / HDL Ratio; Future  -     TSH Rfx On Abnormal To Free T4; Future  -     PSA Screen; Future  -     Urinalysis With Microscopic If Indicated (No Culture) - Urine, Clean Catch; Future  -     Vitamin D 25 Hydroxy; Future    3. Vitamin D deficiency  -     CBC & Differential; Future  -     Comprehensive Metabolic Panel; Future  -     Lipid Panel With LDL / HDL Ratio; Future  -     TSH Rfx On Abnormal To Free T4; Future  -     PSA Screen; Future  -     Urinalysis With Microscopic If Indicated (No Culture) - Urine, Clean Catch; Future  -     Vitamin D 25 Hydroxy; Future    4. Prostate cancer screening  -     CBC & Differential; Future  -     Comprehensive Metabolic Panel; Future  -     Lipid Panel With LDL / HDL Ratio; Future  -     TSH Rfx On Abnormal To Free T4; Future  -     PSA Screen; Future  -     Urinalysis With Microscopic If Indicated (No Culture) - Urine, Clean Catch; Future  -     Vitamin D 25 Hydroxy; Future      Plan    Continue present care. Increase fiber intake,   healthy diet, and exercise carefully. As long as he's doing well, we'll follow up in 6 months with fasting labs at that time prior to his appointment for health maintenance and physical.            Follow Up   No follow-ups on file.  Patient was given instructions and counseling regarding his condition or for health maintenance advice. Please see specific information pulled into the AVS if appropriate.     Transcribed from ambient dictation for James Epley, APRN by Mikki Jurado.  10/07/22   12:47 EDT    Patient or patient representative verbalized consent to the visit recording.  I have personally performed the services described in this document as transcribed by the above individual, and it is both accurate and complete.

## 2023-03-15 ENCOUNTER — TELEPHONE (OUTPATIENT)
Dept: FAMILY MEDICINE CLINIC | Facility: CLINIC | Age: 58
End: 2023-03-15

## 2023-03-15 DIAGNOSIS — M25.529 ELBOW PAIN, UNSPECIFIED LATERALITY: Primary | ICD-10-CM

## 2023-03-15 DIAGNOSIS — Z12.5 PROSTATE CANCER SCREENING: ICD-10-CM

## 2023-03-15 DIAGNOSIS — Z00.00 HEALTH MAINTENANCE EXAMINATION: ICD-10-CM

## 2023-03-15 DIAGNOSIS — E55.9 VITAMIN D DEFICIENCY: ICD-10-CM

## 2023-03-15 DIAGNOSIS — E78.5 MILD HYPERLIPIDEMIA: ICD-10-CM

## 2023-03-15 DIAGNOSIS — R23.9 SKIN CHANGE: ICD-10-CM

## 2023-03-15 NOTE — TELEPHONE ENCOUNTER
Caller: Chuck Fierro    Relationship: Self    Best call back number: [phone]    What is the medical concern/diagnosis: SPOT ON SKIN      RIGHT ELBOW PAIN     What specialty or service is being requested: DERMATOLOGIST AND OCCUPATIONAL THERAPIST     What is the provider, practice or medical service name: N/A FOR THE DERMATOLOGIST   OCCUPATIONAL THERAPIST- [facility] PERSAUD    What is the office phone number: OCCUPATIONAL THERAPIST- [phone]    Any additional details: PATIENT WOULD LIKE FOR THESE REFERRALS TO BE PUT IN

## 2023-03-16 NOTE — TELEPHONE ENCOUNTER
Caller: Chuck Fierro    Relationship: Self    Best call back number: [phone]    Do you know the name of the person who called: GRAYSON    What was the call regarding: PATIENT WAS RETURNING MISSED CALL. HUB RELAYED MESSAGE THAT SHE WAS CALLING REGARDING THE REFERRALS BUT HUB COULD NOT GIVE ANY OTHER INFORMATION.     Do you require a callback: YES

## 2023-03-28 ENCOUNTER — LAB (OUTPATIENT)
Dept: LAB | Facility: HOSPITAL | Age: 58
End: 2023-03-28
Payer: COMMERCIAL

## 2023-03-28 LAB
25(OH)D3 SERPL-MCNC: 44.3 NG/ML (ref 30–100)
ALBUMIN SERPL-MCNC: 4.4 G/DL (ref 3.5–5.2)
ALBUMIN/GLOB SERPL: 1.6 G/DL
ALP SERPL-CCNC: 67 U/L (ref 39–117)
ALT SERPL W P-5'-P-CCNC: 24 U/L (ref 1–41)
ANION GAP SERPL CALCULATED.3IONS-SCNC: 8 MMOL/L (ref 5–15)
AST SERPL-CCNC: 23 U/L (ref 1–40)
BASOPHILS # BLD AUTO: 0.03 10*3/MM3 (ref 0–0.2)
BASOPHILS NFR BLD AUTO: 0.6 % (ref 0–1.5)
BILIRUB SERPL-MCNC: 0.9 MG/DL (ref 0–1.2)
BILIRUB UR QL STRIP: NEGATIVE
BUN SERPL-MCNC: 14 MG/DL (ref 6–20)
BUN/CREAT SERPL: 13.2 (ref 7–25)
CALCIUM SPEC-SCNC: 9.2 MG/DL (ref 8.6–10.5)
CHLORIDE SERPL-SCNC: 108 MMOL/L (ref 98–107)
CHOLEST SERPL-MCNC: 219 MG/DL (ref 0–200)
CLARITY UR: CLEAR
CO2 SERPL-SCNC: 26 MMOL/L (ref 22–29)
COLOR UR: YELLOW
CREAT SERPL-MCNC: 1.06 MG/DL (ref 0.76–1.27)
DEPRECATED RDW RBC AUTO: 41.1 FL (ref 37–54)
EGFRCR SERPLBLD CKD-EPI 2021: 81.9 ML/MIN/1.73
EOSINOPHIL # BLD AUTO: 0.21 10*3/MM3 (ref 0–0.4)
EOSINOPHIL NFR BLD AUTO: 3.9 % (ref 0.3–6.2)
ERYTHROCYTE [DISTWIDTH] IN BLOOD BY AUTOMATED COUNT: 12.8 % (ref 12.3–15.4)
GLOBULIN UR ELPH-MCNC: 2.8 GM/DL
GLUCOSE SERPL-MCNC: 105 MG/DL (ref 65–99)
GLUCOSE UR STRIP-MCNC: NEGATIVE MG/DL
HCT VFR BLD AUTO: 48.9 % (ref 37.5–51)
HDLC SERPL-MCNC: 66 MG/DL (ref 40–60)
HGB BLD-MCNC: 16.4 G/DL (ref 13–17.7)
HGB UR QL STRIP.AUTO: NEGATIVE
IMM GRANULOCYTES # BLD AUTO: 0.01 10*3/MM3 (ref 0–0.05)
IMM GRANULOCYTES NFR BLD AUTO: 0.2 % (ref 0–0.5)
KETONES UR QL STRIP: NEGATIVE
LDLC SERPL CALC-MCNC: 140 MG/DL (ref 0–100)
LDLC/HDLC SERPL: 2.1 {RATIO}
LEUKOCYTE ESTERASE UR QL STRIP.AUTO: NEGATIVE
LYMPHOCYTES # BLD AUTO: 1.7 10*3/MM3 (ref 0.7–3.1)
LYMPHOCYTES NFR BLD AUTO: 31.5 % (ref 19.6–45.3)
MCH RBC QN AUTO: 29.4 PG (ref 26.6–33)
MCHC RBC AUTO-ENTMCNC: 33.5 G/DL (ref 31.5–35.7)
MCV RBC AUTO: 87.6 FL (ref 79–97)
MONOCYTES # BLD AUTO: 0.55 10*3/MM3 (ref 0.1–0.9)
MONOCYTES NFR BLD AUTO: 10.2 % (ref 5–12)
NEUTROPHILS NFR BLD AUTO: 2.9 10*3/MM3 (ref 1.7–7)
NEUTROPHILS NFR BLD AUTO: 53.6 % (ref 42.7–76)
NITRITE UR QL STRIP: NEGATIVE
NRBC BLD AUTO-RTO: 0 /100 WBC (ref 0–0.2)
PH UR STRIP.AUTO: 6.5 [PH] (ref 5–8)
PLATELET # BLD AUTO: 231 10*3/MM3 (ref 140–450)
PMV BLD AUTO: 8.5 FL (ref 6–12)
POTASSIUM SERPL-SCNC: 5.3 MMOL/L (ref 3.5–5.2)
PROT SERPL-MCNC: 7.2 G/DL (ref 6–8.5)
PROT UR QL STRIP: NEGATIVE
PSA SERPL-MCNC: 0.66 NG/ML (ref 0–4)
RBC # BLD AUTO: 5.58 10*6/MM3 (ref 4.14–5.8)
SODIUM SERPL-SCNC: 142 MMOL/L (ref 136–145)
SP GR UR STRIP: 1.01 (ref 1–1.03)
TRIGL SERPL-MCNC: 73 MG/DL (ref 0–150)
TSH SERPL DL<=0.05 MIU/L-ACNC: 1.81 UIU/ML (ref 0.27–4.2)
UROBILINOGEN UR QL STRIP: NORMAL
VLDLC SERPL-MCNC: 13 MG/DL (ref 5–40)
WBC NRBC COR # BLD: 5.4 10*3/MM3 (ref 3.4–10.8)

## 2023-03-28 PROCEDURE — 80050 GENERAL HEALTH PANEL: CPT | Performed by: NURSE PRACTITIONER

## 2023-03-28 PROCEDURE — G0103 PSA SCREENING: HCPCS | Performed by: NURSE PRACTITIONER

## 2023-03-28 PROCEDURE — 82306 VITAMIN D 25 HYDROXY: CPT | Performed by: NURSE PRACTITIONER

## 2023-03-28 PROCEDURE — 80061 LIPID PANEL: CPT | Performed by: NURSE PRACTITIONER

## 2023-03-28 PROCEDURE — 81003 URINALYSIS AUTO W/O SCOPE: CPT | Performed by: NURSE PRACTITIONER

## 2023-03-28 PROCEDURE — 36415 COLL VENOUS BLD VENIPUNCTURE: CPT

## 2023-03-31 ENCOUNTER — OFFICE VISIT (OUTPATIENT)
Dept: FAMILY MEDICINE CLINIC | Facility: CLINIC | Age: 58
End: 2023-03-31
Payer: COMMERCIAL

## 2023-03-31 VITALS
OXYGEN SATURATION: 98 % | WEIGHT: 197.9 LBS | RESPIRATION RATE: 14 BRPM | HEART RATE: 51 BPM | BODY MASS INDEX: 25.4 KG/M2 | HEIGHT: 74 IN | DIASTOLIC BLOOD PRESSURE: 87 MMHG | TEMPERATURE: 97.1 F | SYSTOLIC BLOOD PRESSURE: 121 MMHG

## 2023-03-31 DIAGNOSIS — Z00.00 HEALTH MAINTENANCE EXAMINATION: Primary | ICD-10-CM

## 2023-03-31 DIAGNOSIS — E78.2 MIXED HYPERLIPIDEMIA: ICD-10-CM

## 2023-03-31 PROCEDURE — 99396 PREV VISIT EST AGE 40-64: CPT | Performed by: NURSE PRACTITIONER

## 2023-03-31 NOTE — PROGRESS NOTES
"Chief Complaint  Annual Exam    Subjective        Chuck Fierro presents to [facility]  History of Present Illness  Very pleasant gentleman here today for complete physical exam overall he is doing well, he exercises frequently, he is have no chest pain shortness of breath dizziness weakness or other issues other than he had a bump on his arm was quite painful more than a pimple he would expect and he has appointment for dermatology but thankfully is improving already.    As well as for some time several weeks and or couple months right medial epicondyle tenderness,  He has been working out with a regimen and doing well but he thinks maybe he has an overuse injury he has an appointment, occupational therapist soon.      Social history no tobacco drug or alcohol abuse,  Past medical history generally healthy,  Of alcohol as occupational    Several occasions of very minimal elevated fasting glucose,  With normal A1c last year,  Could be developing impaired fasting glucose, but he takes care of himself exercises frequently has a healthy diet great weight here.    Mild mixed hyperlipidemia with good HDL.          Objective   Vital Signs:  /87   Pulse 51   Temp 97.1 °F (36.2 °C) (Infrared)   Resp 14   Ht 188 cm (74.02\")   Wt 89.8 kg (197 lb 14.4 oz)   SpO2 98%   BMI 25.40 kg/m²   Estimated body mass index is 25.4 kg/m² as calculated from the following:    Height as of this encounter: 188 cm (74.02\").    Weight as of this encounter: 89.8 kg (197 lb 14.4 oz).          Physical Exam  Vitals reviewed.   Constitutional:       General: He is not in acute distress.     Appearance: Normal appearance. He is well-developed. He is not ill-appearing, toxic-appearing or diaphoretic.      Comments: Pleasant appears well   HENT:      Head: Normocephalic and atraumatic.      Right Ear: Tympanic membrane normal.      Left Ear: Tympanic membrane normal.      Nose: Nose normal.      " Mouth/Throat:      Mouth: Mucous membranes are moist.   Eyes:      Conjunctiva/sclera: Conjunctivae normal.      Pupils: Pupils are equal, round, and reactive to light.   Neck:      Vascular: No JVD.      Comments: Thyroid normal carotids normal.  Cardiovascular:      Rate and Rhythm: Normal rate and regular rhythm.      Heart sounds: Normal heart sounds. No murmur heard.  Pulmonary:      Effort: Pulmonary effort is normal. No respiratory distress.      Breath sounds: Normal breath sounds. No wheezing.   Abdominal:      General: Bowel sounds are normal. There is no distension.      Palpations: Abdomen is soft. There is no mass.      Tenderness: There is no abdominal tenderness. There is no guarding.      Hernia: No hernia is present.   Genitourinary:     Testes: Normal.      Comments: No inguinal hernia standing Valsalva  Musculoskeletal:         General: No tenderness.      Cervical back: Neck supple.      Comments: Right elbow, normal range of motion no effusion no redness, tenderness medial epicondyles.       Lymphadenopathy:      Cervical: No cervical adenopathy.   Skin:     General: Skin is warm and dry.   Neurological:      General: No focal deficit present.      Mental Status: He is alert and oriented to person, place, and time. Mental status is at baseline.   Psychiatric:         Mood and Affect: Mood normal.         Behavior: Behavior normal.         Thought Content: Thought content normal.         Judgment: Judgment normal.        Result Review :                Assessment and Plan   Diagnoses and all orders for this visit:    1. Health maintenance examination (Primary)  -     Potassium; Future  -     Hemoglobin A1c; Future             Follow Up   No follow-ups on file.  Patient was given instructions and counseling regarding his condition or for health maintenance advice. Please see specific information pulled into the AVS if appropriate.     There are no Patient Instructions on file for this visit.

## 2023-03-31 NOTE — PATIENT INSTRUCTIONS
Discharge instructions continue healthy diet and exercise, you doing spectacularly  Lots of fluids 64 ounces a day  Joint sparing exercise  No worries any potassium is probably normal nonetheless lets recheck this within the next week or 2 please nonfasting okay and I will simply add an A1c but as long as you are doing well

## 2023-04-04 ENCOUNTER — LAB (OUTPATIENT)
Dept: LAB | Facility: HOSPITAL | Age: 58
End: 2023-04-04
Payer: COMMERCIAL

## 2023-04-04 PROCEDURE — 83036 HEMOGLOBIN GLYCOSYLATED A1C: CPT | Performed by: NURSE PRACTITIONER

## 2023-04-04 PROCEDURE — 84132 ASSAY OF SERUM POTASSIUM: CPT | Performed by: NURSE PRACTITIONER

## 2023-04-11 NOTE — TELEPHONE ENCOUNTER
[facility] has access to [facility]. - faxed order anyways.

## 2023-04-11 NOTE — TELEPHONE ENCOUNTER
PATIENT STATES THAT THE REFERRAL WAS SENT TO THE RIGHT PERSON, BUT THE WRONG FACILITY.     PLEASE FAX TO: [phone]

## 2024-03-19 ENCOUNTER — TELEPHONE (OUTPATIENT)
Dept: FAMILY MEDICINE CLINIC | Facility: CLINIC | Age: 59
End: 2024-03-19
Payer: COMMERCIAL

## 2024-03-19 DIAGNOSIS — E78.2 MIXED HYPERLIPIDEMIA: Primary | ICD-10-CM

## 2024-03-19 DIAGNOSIS — Z12.5 PROSTATE CANCER SCREENING: ICD-10-CM

## 2024-03-19 DIAGNOSIS — Z00.00 HEALTH MAINTENANCE EXAMINATION: ICD-10-CM

## 2024-03-19 NOTE — TELEPHONE ENCOUNTER
Caller: Chuck Fierro    Relationship: Self    Best call back number: [phone]    What orders are you requesting (i.e. lab or imaging): PHYSICAL LABS    In what timeframe would the patient need to come in: 5/9 AT 8AM    Where will you receive your lab/imaging services:IN OFFICE

## 2024-05-07 ENCOUNTER — TELEPHONE (OUTPATIENT)
Dept: FAMILY MEDICINE CLINIC | Facility: CLINIC | Age: 59
End: 2024-05-07

## 2024-05-07 NOTE — TELEPHONE ENCOUNTER
Caller: Chuck Fierro    Relationship to patient: Self    Best call back number: [phone]     Type of visit: LABS    Requested date: 5/10/24, PREFERABLY MORNING     Additional notes: PATIENT REQUESTS CALL BACK TO SCHEDULE LABS

## 2024-05-10 DIAGNOSIS — E78.2 MIXED HYPERLIPIDEMIA: ICD-10-CM

## 2024-05-10 DIAGNOSIS — Z00.00 HEALTH MAINTENANCE EXAMINATION: ICD-10-CM

## 2024-05-10 DIAGNOSIS — Z12.5 PROSTATE CANCER SCREENING: ICD-10-CM

## 2024-05-11 LAB
ALBUMIN SERPL-MCNC: 4.4 G/DL (ref 3.5–5.2)
ALBUMIN/GLOB SERPL: 1.5 G/DL
ALP SERPL-CCNC: 65 U/L (ref 39–117)
ALT SERPL-CCNC: 29 U/L (ref 1–41)
AST SERPL-CCNC: 24 U/L (ref 1–40)
BASOPHILS # BLD AUTO: 0.02 10*3/MM3 (ref 0–0.2)
BASOPHILS NFR BLD AUTO: 0.4 % (ref 0–1.5)
BILIRUB SERPL-MCNC: 0.8 MG/DL (ref 0–1.2)
BUN SERPL-MCNC: 13 MG/DL (ref 6–20)
BUN/CREAT SERPL: 13.3 (ref 7–25)
CALCIUM SERPL-MCNC: 9.4 MG/DL (ref 8.6–10.5)
CHLORIDE SERPL-SCNC: 105 MMOL/L (ref 98–107)
CHOLEST SERPL-MCNC: 231 MG/DL (ref 0–200)
CO2 SERPL-SCNC: 26.8 MMOL/L (ref 22–29)
CREAT SERPL-MCNC: 0.98 MG/DL (ref 0.76–1.27)
EGFRCR SERPLBLD CKD-EPI 2021: 89.4 ML/MIN/1.73
EOSINOPHIL # BLD AUTO: 0.22 10*3/MM3 (ref 0–0.4)
EOSINOPHIL NFR BLD AUTO: 4.2 % (ref 0.3–6.2)
ERYTHROCYTE [DISTWIDTH] IN BLOOD BY AUTOMATED COUNT: 12.5 % (ref 12.3–15.4)
GLOBULIN SER CALC-MCNC: 2.9 GM/DL
GLUCOSE SERPL-MCNC: 99 MG/DL (ref 65–99)
HCT VFR BLD AUTO: 48.4 % (ref 37.5–51)
HDLC SERPL-MCNC: 63 MG/DL (ref 40–60)
HGB BLD-MCNC: 16.5 G/DL (ref 13–17.7)
IMM GRANULOCYTES # BLD AUTO: 0.01 10*3/MM3 (ref 0–0.05)
IMM GRANULOCYTES NFR BLD AUTO: 0.2 % (ref 0–0.5)
LDLC SERPL CALC-MCNC: 156 MG/DL (ref 0–100)
LDLC/HDLC SERPL: 2.44 {RATIO}
LYMPHOCYTES # BLD AUTO: 1.79 10*3/MM3 (ref 0.7–3.1)
LYMPHOCYTES NFR BLD AUTO: 34.3 % (ref 19.6–45.3)
MCH RBC QN AUTO: 28.5 PG (ref 26.6–33)
MCHC RBC AUTO-ENTMCNC: 34.1 G/DL (ref 31.5–35.7)
MCV RBC AUTO: 83.7 FL (ref 79–97)
MONOCYTES # BLD AUTO: 0.59 10*3/MM3 (ref 0.1–0.9)
MONOCYTES NFR BLD AUTO: 11.3 % (ref 5–12)
NEUTROPHILS # BLD AUTO: 2.59 10*3/MM3 (ref 1.7–7)
NEUTROPHILS NFR BLD AUTO: 49.6 % (ref 42.7–76)
NRBC BLD AUTO-RTO: 0 /100 WBC (ref 0–0.2)
PLATELET # BLD AUTO: 220 10*3/MM3 (ref 140–450)
POTASSIUM SERPL-SCNC: 5.3 MMOL/L (ref 3.5–5.2)
PROT SERPL-MCNC: 7.3 G/DL (ref 6–8.5)
PSA SERPL-MCNC: 1.2 NG/ML (ref 0–4)
RBC # BLD AUTO: 5.78 10*6/MM3 (ref 4.14–5.8)
SODIUM SERPL-SCNC: 140 MMOL/L (ref 136–145)
TRIGL SERPL-MCNC: 72 MG/DL (ref 0–150)
VLDLC SERPL CALC-MCNC: 12 MG/DL (ref 5–40)
WBC # BLD AUTO: 5.22 10*3/MM3 (ref 3.4–10.8)

## 2024-05-16 ENCOUNTER — TELEPHONE (OUTPATIENT)
Dept: FAMILY MEDICINE CLINIC | Facility: CLINIC | Age: 59
End: 2024-05-16

## 2024-05-16 ENCOUNTER — OFFICE VISIT (OUTPATIENT)
Dept: FAMILY MEDICINE CLINIC | Facility: CLINIC | Age: 59
End: 2024-05-16
Payer: COMMERCIAL

## 2024-05-16 VITALS
SYSTOLIC BLOOD PRESSURE: 118 MMHG | HEART RATE: 51 BPM | HEIGHT: 74 IN | RESPIRATION RATE: 16 BRPM | BODY MASS INDEX: 25.28 KG/M2 | WEIGHT: 197 LBS | DIASTOLIC BLOOD PRESSURE: 76 MMHG | OXYGEN SATURATION: 99 %

## 2024-05-16 DIAGNOSIS — N48.6 PEYRONIE'S DISEASE: ICD-10-CM

## 2024-05-16 DIAGNOSIS — E87.5 HYPERKALEMIA: ICD-10-CM

## 2024-05-16 DIAGNOSIS — Z00.00 HEALTH MAINTENANCE EXAMINATION: Primary | ICD-10-CM

## 2024-05-16 DIAGNOSIS — E78.5 MILD HYPERLIPIDEMIA: ICD-10-CM

## 2024-05-16 DIAGNOSIS — R68.82 LOW LIBIDO: ICD-10-CM

## 2024-05-16 PROCEDURE — 99396 PREV VISIT EST AGE 40-64: CPT | Performed by: NURSE PRACTITIONER

## 2024-05-16 RX ORDER — SILDENAFIL 100 MG/1
100 TABLET, FILM COATED ORAL DAILY PRN
Qty: 30 TABLET | Refills: 5 | Status: SHIPPED | OUTPATIENT
Start: 2024-05-16

## 2024-05-16 NOTE — PATIENT INSTRUCTIONS
Discharge instructions, outpatient potassium recheck,  Avoid workout for at least 3 days prior to,  Normal hydration, check your lab before 9 AM and checking a testosterone,      See urology as needed  Call if you need a referral

## 2024-05-16 NOTE — PROGRESS NOTES
"Chief Complaint  Annual Exam (Pt here for annual exam )    Subjective        Chuck Fierro presents to [facility]  History of Present Illness  Very pleasant gentleman here today for complete physical exam, overall he is doing well no chest pain no shortness of breath just went to dermatology, everything checked out well uses quite a bit of skin protection and wears a large hat likes to bike enjoys riding, travels a lot, cholesterol is a little high recently,  Potassium is little high for some reason last year and then we rechecked it it was normal does not take supplements, normal kidney function is healthy he does bike riding work    Saw urology several years ago had some Peyronie's, he had injections and helped quite a bit but his libido has dropped over the last few years,  Not been able to have erections like he has in the past and has questions about what options he may have    Social history as above no drug alcohol tobacco abuse, healthy happy  Past medical history healthy has some mild cholesterol  Family history no early stroke heart attack    Up-to-date on screenings,  He does not want to take any cholesterol medication blood pressure is good        Objective   Vital Signs:  /76   Pulse 51   Resp 16   Ht 188 cm (74\")   Wt 89.4 kg (197 lb)   SpO2 99%   BMI 25.29 kg/m²   Estimated body mass index is 25.29 kg/m² as calculated from the following:    Height as of this encounter: 188 cm (74\").    Weight as of this encounter: 89.4 kg (197 lb).          Physical Exam   Result Review :                Assessment and Plan   Diagnoses and all orders for this visit:    1. Health maintenance examination (Primary)    2. Mild hyperlipidemia    3. Hyperkalemia  -     Testosterone, Free, Total; Future  -     Basic Metabolic Panel; Future    4. Low libido  -     Testosterone, Free, Total; Future  -     Basic Metabolic Panel; Future    5. Peyronie's disease  Comments:  Urology " previously injections improved    Other orders  -     sildenafil (Viagra) 100 MG tablet; Take 1 tablet by mouth Daily As Needed for Erectile Dysfunction.  Dispense: 30 tablet; Refill: 5             Follow Up   Return for Annual physical, Labs before next visit.  Patient was given instructions and counseling regarding his condition or for health maintenance advice. Please see specific information pulled into the AVS if appropriate.     Metamucil   Fiber less proc foods 10 card risk disc statins pt perf no meds  Lab r/b viagra                  Patient Instructions   Discharge instructions, outpatient potassium recheck,  Avoid workout for at least 3 days prior to,  Normal hydration, check your lab before 9 AM and checking a testosterone,      See urology as needed  Call if you need a referral

## 2025-05-19 ENCOUNTER — OFFICE VISIT (OUTPATIENT)
Dept: FAMILY MEDICINE CLINIC | Facility: CLINIC | Age: 60
End: 2025-05-19
Payer: COMMERCIAL

## 2025-05-19 VITALS
HEIGHT: 74 IN | DIASTOLIC BLOOD PRESSURE: 80 MMHG | SYSTOLIC BLOOD PRESSURE: 135 MMHG | WEIGHT: 195.7 LBS | BODY MASS INDEX: 25.12 KG/M2 | OXYGEN SATURATION: 100 % | HEART RATE: 49 BPM | TEMPERATURE: 98.2 F

## 2025-05-19 DIAGNOSIS — Z12.11 COLON CANCER SCREENING: ICD-10-CM

## 2025-05-19 DIAGNOSIS — Z12.5 PROSTATE CANCER SCREENING: ICD-10-CM

## 2025-05-19 DIAGNOSIS — E78.5 MILD HYPERLIPIDEMIA: ICD-10-CM

## 2025-05-19 DIAGNOSIS — Z00.00 HEALTHCARE MAINTENANCE: Primary | ICD-10-CM

## 2025-05-19 PROCEDURE — 99396 PREV VISIT EST AGE 40-64: CPT | Performed by: NURSE PRACTITIONER

## 2025-05-19 NOTE — PROGRESS NOTES
"Chief Complaint  Annual Exam    Subjective        Chuck Fierro presents to [facility]  History of Present Illness  Very pleasant gentleman here today for health maintenance he is doing well, he likes to cycle quite a bit stays in health, he is training now, he is an athlete he is typically sinus bradycardia has a healthy heart  , CT calcium score very low score of 2 several years ago,  He has no chest pain no shortness of breath, he is here for labs as well, colonoscopy 9 years ago he thinks he would like to go ahead and get it this fall that would be great,  No tobacco drug or alcohol abuse,  Family history no change    No early heart disease.  No colon cancer.  No prostate cancer.        Objective   Vital Signs:  /80 (BP Location: Right arm, Patient Position: Sitting, Cuff Size: Adult)   Pulse (!) 49   Temp 98.2 °F (36.8 °C) (Temporal)   Ht 188 cm (74.02\")   Wt 88.8 kg (195 lb 11.2 oz)   SpO2 100%   BMI 25.12 kg/m²   Estimated body mass index is 25.12 kg/m² as calculated from the following:    Height as of this encounter: 188 cm (74.02\").    Weight as of this encounter: 88.8 kg (195 lb 11.2 oz).          Physical Exam  Vitals reviewed.   Constitutional:       General: He is not in acute distress.     Appearance: Normal appearance. He is well-developed. He is not ill-appearing, toxic-appearing or diaphoretic.   HENT:      Head: Normocephalic.      Nose: Nose normal.   Eyes:      General: No scleral icterus.     Conjunctiva/sclera: Conjunctivae normal.      Pupils: Pupils are equal, round, and reactive to light.   Neck:      Thyroid: No thyromegaly.      Vascular: No JVD.   Cardiovascular:      Rate and Rhythm: Normal rate and regular rhythm.      Heart sounds: Normal heart sounds. No murmur heard.     No friction rub. No gallop.   Pulmonary:      Effort: Pulmonary effort is normal. No respiratory distress.      Breath sounds: Normal breath sounds. No stridor. No wheezing " or rales.   Abdominal:      General: Bowel sounds are normal. There is no distension.      Palpations: Abdomen is soft.      Tenderness: There is no abdominal tenderness.      Comments: No hepatosplenomegaly, no ascites,   Musculoskeletal:         General: No tenderness.      Cervical back: Neck supple.   Lymphadenopathy:      Cervical: No cervical adenopathy.   Skin:     General: Skin is warm and dry.      Findings: No erythema or rash.   Neurological:      Mental Status: He is alert and oriented to person, place, and time.      Deep Tendon Reflexes: Reflexes are normal and symmetric.   Psychiatric:         Behavior: Behavior normal.         Thought Content: Thought content normal.         Judgment: Judgment normal.        Result Review :                Assessment and Plan   Diagnoses and all orders for this visit:    1. Healthcare maintenance (Primary)  -     Hemoglobin A1c  -     CBC & Differential  -     Comprehensive Metabolic Panel  -     Lipid Panel With LDL / HDL Ratio  -     TSH Rfx On Abnormal To Free T4  -     Urinalysis With Microscopic - Urine, Clean Catch    2. Mild hyperlipidemia  -     Lipid Panel With LDL / HDL Ratio    3. Prostate cancer screening  -     Hemoglobin A1c  -     CBC & Differential  -     Comprehensive Metabolic Panel  -     Lipid Panel With LDL / HDL Ratio  -     TSH Rfx On Abnormal To Free T4  -     Urinalysis With Microscopic - Urine, Clean Catch  -     PSA Screen    4. Colon cancer screening  -     Ambulatory Referral For Screening Colonoscopy             Follow Up   No follow-ups on file.  Patient was given instructions and counseling regarding his condition or for health maintenance advice. Please see specific information pulled into the AVS if appropriate.     Patient Instructions   Discharge instructions, continue healthy diet regular exercise you are doing spectacular,  Look healthy, you have a low heart rate in good health, your heart is healthy    Continue joint sparing  complains of pain/discomfort exercises, he doing great,  Colonoscopy sometime this fall    CT calcium score probably in a couple years and will get routine EKG with you next year,  Consider vascular screenings as well carotid aorta lower extremity we can approach this next year anytime    Cholesterol medication at this time is optional, will look at your 10-year cardiovascular risk yearly to help you decide which best for you,    Good foot stretching, always good,  Follow-up yearly.        (1) Other Medications/None

## 2025-05-19 NOTE — PATIENT INSTRUCTIONS
Discharge instructions, continue healthy diet regular exercise you are doing spectacular,  Look healthy, you have a low heart rate in good health, your heart is healthy    Continue joint sparing exercises, he doing great,  Colonoscopy sometime this fall    CT calcium score probably in a couple years and will get routine EKG with you next year,  Consider vascular screenings as well carotid aorta lower extremity we can approach this next year anytime    Cholesterol medication at this time is optional, will look at your 10-year cardiovascular risk yearly to help you decide which best for you,    Good foot stretching, always good,  Follow-up yearly.

## 2025-05-20 LAB
ALBUMIN SERPL-MCNC: 4.5 G/DL (ref 3.5–5.2)
ALBUMIN/GLOB SERPL: 1.5 G/DL
ALP SERPL-CCNC: 85 U/L (ref 39–117)
ALT SERPL-CCNC: 31 U/L (ref 1–41)
APPEARANCE UR: CLEAR
AST SERPL-CCNC: 30 U/L (ref 1–40)
BACTERIA #/AREA URNS HPF: NORMAL /HPF
BASOPHILS # BLD AUTO: 0.02 10*3/MM3 (ref 0–0.2)
BASOPHILS NFR BLD AUTO: 0.4 % (ref 0–1.5)
BILIRUB SERPL-MCNC: 1.1 MG/DL (ref 0–1.2)
BILIRUB UR QL STRIP: NEGATIVE
BUN SERPL-MCNC: 11 MG/DL (ref 6–20)
BUN/CREAT SERPL: 10.9 (ref 7–25)
CALCIUM SERPL-MCNC: 9.3 MG/DL (ref 8.6–10.5)
CASTS URNS MICRO: NORMAL
CHLORIDE SERPL-SCNC: 102 MMOL/L (ref 98–107)
CHOLEST SERPL-MCNC: 233 MG/DL (ref 0–200)
CO2 SERPL-SCNC: 27.7 MMOL/L (ref 22–29)
COLOR UR: YELLOW
CREAT SERPL-MCNC: 1.01 MG/DL (ref 0.76–1.27)
EGFRCR SERPLBLD CKD-EPI 2021: 85.7 ML/MIN/1.73
EOSINOPHIL # BLD AUTO: 0.24 10*3/MM3 (ref 0–0.4)
EOSINOPHIL NFR BLD AUTO: 4.4 % (ref 0.3–6.2)
EPI CELLS #/AREA URNS HPF: NORMAL /HPF
ERYTHROCYTE [DISTWIDTH] IN BLOOD BY AUTOMATED COUNT: 12.9 % (ref 12.3–15.4)
GLOBULIN SER CALC-MCNC: 3.1 GM/DL
GLUCOSE SERPL-MCNC: 90 MG/DL (ref 65–99)
GLUCOSE UR QL STRIP: NEGATIVE
HBA1C MFR BLD: 5.4 % (ref 4.8–5.6)
HCT VFR BLD AUTO: 48.6 % (ref 37.5–51)
HDLC SERPL-MCNC: 66 MG/DL (ref 40–60)
HGB BLD-MCNC: 16.2 G/DL (ref 13–17.7)
HGB UR QL STRIP: NEGATIVE
IMM GRANULOCYTES # BLD AUTO: 0.01 10*3/MM3 (ref 0–0.05)
IMM GRANULOCYTES NFR BLD AUTO: 0.2 % (ref 0–0.5)
KETONES UR QL STRIP: NEGATIVE
LDLC SERPL CALC-MCNC: 155 MG/DL (ref 0–100)
LDLC/HDLC SERPL: 2.32 {RATIO}
LEUKOCYTE ESTERASE UR QL STRIP: NEGATIVE
LYMPHOCYTES # BLD AUTO: 2.09 10*3/MM3 (ref 0.7–3.1)
LYMPHOCYTES NFR BLD AUTO: 38.7 % (ref 19.6–45.3)
MCH RBC QN AUTO: 28.9 PG (ref 26.6–33)
MCHC RBC AUTO-ENTMCNC: 33.3 G/DL (ref 31.5–35.7)
MCV RBC AUTO: 86.8 FL (ref 79–97)
MONOCYTES # BLD AUTO: 0.63 10*3/MM3 (ref 0.1–0.9)
MONOCYTES NFR BLD AUTO: 11.7 % (ref 5–12)
NEUTROPHILS # BLD AUTO: 2.41 10*3/MM3 (ref 1.7–7)
NEUTROPHILS NFR BLD AUTO: 44.6 % (ref 42.7–76)
NITRITE UR QL STRIP: NEGATIVE
NRBC BLD AUTO-RTO: 0 /100 WBC (ref 0–0.2)
PH UR STRIP: 6.5 [PH] (ref 5–8)
PLATELET # BLD AUTO: 224 10*3/MM3 (ref 140–450)
POTASSIUM SERPL-SCNC: 4.7 MMOL/L (ref 3.5–5.2)
PROT SERPL-MCNC: 7.6 G/DL (ref 6–8.5)
PROT UR QL STRIP: NEGATIVE
PSA SERPL-MCNC: 0.67 NG/ML (ref 0–4)
RBC # BLD AUTO: 5.6 10*6/MM3 (ref 4.14–5.8)
RBC #/AREA URNS HPF: NORMAL /HPF
SODIUM SERPL-SCNC: 139 MMOL/L (ref 136–145)
SP GR UR STRIP: 1.01 (ref 1–1.03)
TRIGL SERPL-MCNC: 68 MG/DL (ref 0–150)
TSH SERPL DL<=0.005 MIU/L-ACNC: 1.43 UIU/ML (ref 0.27–4.2)
UROBILINOGEN UR STRIP-MCNC: NORMAL MG/DL
VLDLC SERPL CALC-MCNC: 12 MG/DL (ref 5–40)
WBC # BLD AUTO: 5.4 10*3/MM3 (ref 3.4–10.8)
WBC #/AREA URNS HPF: NORMAL /HPF

## 2025-07-14 ENCOUNTER — TELEPHONE (OUTPATIENT)
Dept: GASTROENTEROLOGY | Facility: CLINIC | Age: 60
End: 2025-07-14
Payer: COMMERCIAL

## 2025-07-16 ENCOUNTER — PREP FOR SURGERY (OUTPATIENT)
Dept: OTHER | Facility: HOSPITAL | Age: 60
End: 2025-07-16
Payer: COMMERCIAL

## 2025-07-16 ENCOUNTER — TELEPHONE (OUTPATIENT)
Dept: GASTROENTEROLOGY | Facility: CLINIC | Age: 60
End: 2025-07-16
Payer: COMMERCIAL

## 2025-07-16 DIAGNOSIS — Z12.11 ENCOUNTER FOR SCREENING FOR MALIGNANT NEOPLASM OF COLON: Primary | ICD-10-CM

## 2025-07-16 NOTE — TELEPHONE ENCOUNTER
CALLED TO SCHEDULE PT ,[facility] WOULD CALL BACK D/T HE HAS A HARD TIME HEARING AT THE MOMENT OK FOR THE HUB TO RELAY

## 2025-07-22 ENCOUNTER — TELEPHONE (OUTPATIENT)
Dept: GASTROENTEROLOGY | Facility: CLINIC | Age: 60
End: 2025-07-22
Payer: COMMERCIAL

## 2025-07-22 NOTE — TELEPHONE ENCOUNTER
Advised that [facility] will call with final arrival time minimum 24 hrs before procedure. IF they do not get a phone call, arrival time will stay the same as given on instructions OK FOR THE HUB TO RELAY   Md saldana   Date 11/10/2025